# Patient Record
Sex: FEMALE | Race: BLACK OR AFRICAN AMERICAN | NOT HISPANIC OR LATINO | Employment: OTHER | URBAN - METROPOLITAN AREA
[De-identification: names, ages, dates, MRNs, and addresses within clinical notes are randomized per-mention and may not be internally consistent; named-entity substitution may affect disease eponyms.]

---

## 2018-04-16 ENCOUNTER — OFFICE VISIT (OUTPATIENT)
Dept: OBSTETRICS AND GYNECOLOGY | Facility: CLINIC | Age: 28
End: 2018-04-16
Payer: MEDICAID

## 2018-04-16 VITALS
HEIGHT: 65 IN | BODY MASS INDEX: 22.77 KG/M2 | SYSTOLIC BLOOD PRESSURE: 148 MMHG | WEIGHT: 136.69 LBS | DIASTOLIC BLOOD PRESSURE: 100 MMHG

## 2018-04-16 DIAGNOSIS — N76.4 VULVAR ABSCESS: Primary | ICD-10-CM

## 2018-04-16 PROCEDURE — 99999 PR PBB SHADOW E&M-EST. PATIENT-LVL II: CPT | Mod: PBBFAC,,, | Performed by: OBSTETRICS & GYNECOLOGY

## 2018-04-16 PROCEDURE — 99212 OFFICE O/P EST SF 10 MIN: CPT | Mod: PBBFAC,PN | Performed by: OBSTETRICS & GYNECOLOGY

## 2018-04-16 PROCEDURE — 99214 OFFICE O/P EST MOD 30 MIN: CPT | Mod: S$PBB,,, | Performed by: OBSTETRICS & GYNECOLOGY

## 2018-04-16 RX ORDER — CETIRIZINE HYDROCHLORIDE 10 MG/1
10 TABLET, CHEWABLE ORAL
COMMUNITY
End: 2018-04-16

## 2018-04-16 RX ORDER — SULFAMETHOXAZOLE AND TRIMETHOPRIM 400; 80 MG/1; MG/1
1 TABLET ORAL 2 TIMES DAILY
Qty: 20 TABLET | Refills: 0 | Status: SHIPPED | OUTPATIENT
Start: 2018-04-16 | End: 2018-04-26

## 2018-04-16 NOTE — PROGRESS NOTES
"Subjective:       Patient ID: Yumiko Bautista is a 27 y.o. female.    Chief Complaint:  Lymphadenopathy (during cycle under arm and vag area)      History of Present Illness  HPI  here for problem   C/o tenderness in left arm pit as well as "boil" in groin    Feels boil in groin has been there since high school; waxes; no current drainage from boil  But painful    Will get records for review  GYN & OB History  Patient's last menstrual period was 03/31/2018 (exact date).   Date of Last Pap: No result found    OB History   No data available       Review of Systems  Review of Systems   Constitutional: Negative for activity change, appetite change, chills, diaphoresis, fatigue, fever and unexpected weight change.   HENT: Negative for mouth sores and tinnitus.    Eyes: Negative for discharge and visual disturbance.   Respiratory: Negative for cough, shortness of breath and wheezing.    Cardiovascular: Negative for chest pain, palpitations and leg swelling.   Gastrointestinal: Negative for abdominal pain, bloating, blood in stool, constipation, diarrhea, nausea and vomiting.   Endocrine: Negative for diabetes, hair loss, hot flashes, hyperthyroidism and hypothyroidism.   Genitourinary: Positive for vaginal pain. Negative for decreased libido, dyspareunia, dysuria, flank pain, frequency, genital sores, hematuria, menorrhagia, menstrual problem, pelvic pain, urgency, vaginal bleeding, vaginal discharge, dysmenorrhea, urinary incontinence, postcoital bleeding, postmenopausal bleeding and vaginal odor.   Musculoskeletal: Negative for back pain and myalgias.   Skin:  Negative for rash, no acne and hair changes.   Neurological: Negative for seizures, syncope, numbness and headaches.   Hematological: Negative for adenopathy. Does not bruise/bleed easily.   Psychiatric/Behavioral: Negative for depression and sleep disturbance. The patient is not nervous/anxious.    Breast: Negative for breast mass, breast pain, nipple discharge " and skin changes          Objective:    Physical Exam:        Pulmonary/Chest: Right breast exhibits no tenderness. Left breast exhibits no tenderness.              Genitourinary:       Right adnexum displays no mass. Left adnexum displays no mass.                        Assessment:        1. Vulvar abscess               Plan:      Patient offered I&d of vulvar abscess  Declined  Advised abx ointment, warm compresses; rx sent for septra  Advised gentle skin cleansing

## 2018-12-17 ENCOUNTER — HOSPITAL ENCOUNTER (OUTPATIENT)
Dept: RADIOLOGY | Facility: HOSPITAL | Age: 28
Discharge: HOME | End: 2018-12-17
Attending: FAMILY MEDICINE
Payer: MEDICAID

## 2018-12-17 DIAGNOSIS — R10.2 PELVIC PAIN IN FEMALE: ICD-10-CM

## 2018-12-17 PROCEDURE — 76856 US EXAM PELVIC COMPLETE: CPT | Mod: TC

## 2019-02-22 ENCOUNTER — OFFICE VISIT (OUTPATIENT)
Dept: OBSTETRICS AND GYNECOLOGY | Facility: CLINIC | Age: 29
End: 2019-02-22
Payer: MEDICAID

## 2019-02-22 VITALS
BODY MASS INDEX: 25.01 KG/M2 | HEIGHT: 65 IN | DIASTOLIC BLOOD PRESSURE: 78 MMHG | SYSTOLIC BLOOD PRESSURE: 120 MMHG | WEIGHT: 150.13 LBS

## 2019-02-22 DIAGNOSIS — F52.5 FUNCTIONAL VAGINISMUS: ICD-10-CM

## 2019-02-22 DIAGNOSIS — Z01.419 WELL WOMAN EXAM WITH ROUTINE GYNECOLOGICAL EXAM: Primary | ICD-10-CM

## 2019-02-22 PROCEDURE — 99395 PREV VISIT EST AGE 18-39: CPT | Mod: S$PBB,,, | Performed by: OBSTETRICS & GYNECOLOGY

## 2019-02-22 PROCEDURE — 99395 PR PREVENTIVE VISIT,EST,18-39: ICD-10-PCS | Mod: S$PBB,,, | Performed by: OBSTETRICS & GYNECOLOGY

## 2019-02-22 PROCEDURE — 99999 PR PBB SHADOW E&M-EST. PATIENT-LVL III: ICD-10-PCS | Mod: PBBFAC,,, | Performed by: OBSTETRICS & GYNECOLOGY

## 2019-02-22 PROCEDURE — 99213 OFFICE O/P EST LOW 20 MIN: CPT | Mod: PBBFAC | Performed by: OBSTETRICS & GYNECOLOGY

## 2019-02-22 PROCEDURE — 99999 PR PBB SHADOW E&M-EST. PATIENT-LVL III: CPT | Mod: PBBFAC,,, | Performed by: OBSTETRICS & GYNECOLOGY

## 2019-02-22 RX ORDER — NIFEDIPINE 30 MG/1
TABLET, EXTENDED RELEASE ORAL
Refills: 0 | COMMUNITY
Start: 2019-02-05 | End: 2019-07-18

## 2019-02-22 RX ORDER — LORATADINE 10 MG/1
TABLET ORAL
Refills: 0 | COMMUNITY
Start: 2019-01-30 | End: 2019-07-18

## 2019-02-22 RX ORDER — DIVALPROEX SODIUM 500 MG/1
TABLET, DELAYED RELEASE ORAL
Refills: 3 | COMMUNITY
Start: 2019-01-18 | End: 2019-07-18

## 2019-02-28 NOTE — PROGRESS NOTES
HPI:  28 y.o. female patient  presents today for annual exam.  She reports longstanding issues with vaginal pain and tightness with insertion of anything.  She has been unable to use tampons, have intercourse, or tolerate pelvic exams.  She reports regular menses every month with no problems.  Also reports noticing a small cyst on the left vulva that occurs just with her periods.  She reports having issues with her bladder and urethra as a child and had to undergo multiple procedures and catheterizations due to that.  That experience was traumatic to her and she feels unable to relax with any type of exam related to her pelvic area.      Past Medical History:   Diagnosis Date    Hypertension     Mental disorder     Bi polar       Past Surgical History:   Procedure Laterality Date    cyst removal         REVIEW OF SYSTEMS:  GENERAL:  No fever, chills, fatigue, or weight loss  ABDOMEN:  Normal appetite, no weight loss or abdominal pain  URINARY:  No flank pain, dysuria, or hematuria  REPRODUCTIVE:  No abnormal vaginal bleeding  BREASTS:  No tenderness, masses, or nipple discharge noted of breasts    PHYSICAL EXAM:    APPEARANCE:  Well nourished, well developed, in no acute distress  NECK:  Symmetric without masses or thyromegaly  BREASTS:  Symmetrical, no skin changes or visible lesions.  No palpable masses, nipple discharge, or adenopathy bilaterally.  ABDOMEN:  Soft, no tenderness or masses, no distension noted  PELVIC:  VULVA:  No lesions.  Normal female genitalia  URETHRAL MEATUS:  Normal size and location.  No lesions.  No prolapse  URETHRA:  No masses, tenderness, prolapse, or scarring  VAGINA:  No lesions or discharge.  No significant cystocele or rectocele.  Unable to place a speculum due to extreme muscle tightness and patient intolerance of exam.  CERVIX:  Unable to visualize  UTERUS:  Unable to do exam due to patient intolerance  ADNEXA:  Unable to do exam  ANUS AND PERINEUM:  No lesions.  No  external hemorrhoids    Wet prep - negative    1. Well woman exam with routine gynecological exam     2. Functional vaginismus  Ambulatory consult to Physical Therapy         PLAN:  Discussed with patient that her history and exam are c/w a diagnosis of vaginismus.  Discussed vaginal dilator therapy with her.  I would recommend pelvic floor physical therapy for this.   Patient counseled regarding recommended routine health maintenance for her age.

## 2019-03-08 ENCOUNTER — CLINICAL SUPPORT (OUTPATIENT)
Dept: REHABILITATION | Facility: HOSPITAL | Age: 29
End: 2019-03-08
Attending: OBSTETRICS & GYNECOLOGY
Payer: MEDICAID

## 2019-03-08 DIAGNOSIS — R10.2 PELVIC PAIN IN FEMALE: Primary | ICD-10-CM

## 2019-03-08 PROCEDURE — 97161 PT EVAL LOW COMPLEX 20 MIN: CPT | Performed by: PHYSICAL THERAPIST

## 2019-03-08 NOTE — PROGRESS NOTES
OUTPATIENT PHYSICAL THERAPY EVALUATION        Patient: Yumiko GUSTAFSON [facility] #:  [id number]    Date of treatment: 03/08/2019         HISTORY      Yumiko GUSTAFSON is a 28 y.o. female evaluated on 03/08/2019    Physician:  Darlene Oliveros*   Diagnosis:   Encounter Diagnosis   Name Primary?    Pelvic pain in female Yes      Treatment ordered: Physical Therapy  Medical History:   Past Medical History:   Diagnosis Date    Hypertension     Mental disorder     Bi polar      Surgical History:   Past Surgical History:   Procedure Laterality Date    cyst removal        Medications:   Current Outpatient Medications   Medication Sig    divalproex (DEPAKOTE) 500 MG TbEC TK 1 T PO BID    loratadine (CLARITIN) 10 mg tablet TK 1 T PO AT BEDTIME PRN    NIFEdipine (PROCARDIA-XL) 30 MG (OSM) 24 hr tablet TK 1 T PO QAM     No current facility-administered medications for this visit.        Allergies: Review of patient's allergies indicates:  No Known Allergies     Precautions:     OB/GYN History:  pain with vaginal exam    Bladder/Bowel History: childhood bladder problems and constipation/straining for movement      SUBJECTIVE     Date of onset: Pt reports that she had traumatic experiences as a child being catherized for bladder infections and retention of urine that led her to loathe putting tampons inside her and becoming intimate with partners. Pt also reports vaginal exams with a lot of anxiety and she tenses her leg and pelvic mm. She denies pain with penetration of instruments or penis    Patient's goals for therapy: wear tampons, feel comfortable having sex, able to go to womans health exams without fear  Pain: Patient reports 0/10  Activities that cause symptoms: sexual activity and tampon insertion    Previous treatment included no    Sexually active? No at moment but has had relationships problems due to not wanting to be intimate  Pt denies bladder problems        Frequency of bowel movements: once every 2-10  days  Difficulty initiating BM: Yes  Quality/Shape of BM: type 1-3  Colon leakage: No  Frequency of incidents: 0   Amount leaked (bowels): 0  Form of protection: none  Number of pads required in 24 hours: 0      Types of fluid intake: Pt reports mostly drinking lemonade during the day and not much water  Diet: does not get fibrous foods in daily  Current exercise:none    Occupation: Pt works as a carecare taker     OBJECTIVE     ORTHO SCREEN  Posture: increased activation of gluteal mm  Pelvic alignment: no sign of deviations noted in supine    ABDOMINALS  Scarring: none  Diastasis: 0 fingers above umbilicus, 0 fingers at umbilicus, 0 fingers below umbilicus   Abdominal strength: Rectus: 3/5     TA: 3/5  Chaperone: refused  Consent signed: Yes    VAGINAL PELVIC FLOOR EXAM    EXTERNAL ASSESSMENT  Introitus: WNL  Skin condition: WNL- cyst on L labia majora with pain  Scarring: none   Sensation: WNL   Pain:  none  Voluntary contraction: visible lift  Voluntary relaxation: visible drop  Involuntary contraction: visible lift  Bearing down: reflex tightening  Perineal descent: absent      INTERNAL ASSESSMENT- not assessed today  Pain:   Sensation:   Vaginal vault:    Muscle Bulk:    Muscle Power: /5  Muscle Endurance:  sec  # Reps To Fatigue:     Fast Contractions:      Quality of contraction:    Specificity:    Coordination:    Prolapse check:  Comments:       TREATMENT      .    Education: instructed on general anatomy/physiology of urinary/bowel system; discussed plan of care with patient and parent/guardian; instructed in benefits/risks of treatment; instructed in alternative methods of treatment; instructed in risks of refusing treatment; patient a parent agreed to treatment plan.     Also educated in: anatomy/physiology of pelvic floor, vulvar irritants, dilator use and diaphragmatic breathing    ASSESSMENT      This is a 28 y.o. female referred to outpatient physical therapy and presents with a medical diagnosis of  pelvic pain and constipation. Patient will benefit from skilled physical therapy to improve function and pain.    SEMG Problems: did not evaluate  No skin irritation, erythema, or other adverse effects observed from electrode placement.    Educational/Spiritual/Cultural needs: none  Abuse/Neglect: no signs  Nutritional Status: WDWN   Fall Risk: 0    Pt's spiritual, cultural and educational needs considered and pt agreeable to plan of care and goals as stated below:     Medical necessity is demonstrated by the following IMPAIRMENTS/PROMBLEMS     History  Co-morbidities and personal factors that may impact the plan of care Examination  Body Structures and Functions, activity limitations and participation restrictions that may impact the plan of care Clinical Presentation   Decision Making/ Complexity Score   Co-morbidities:                 Personal Factors:    Body Regions/Systems/Functions:    poor trunk stability           Activity limitations:   Barriers to Learning: none  Environmental Barriers: none noted    Participation Restrictions:           low           PLAN    Frequency: 1-2 x  Weekly for 6 weeks  Duration: 2-3 weeks  Short Term Goals: 2-3 weeks   1. Pt report increasing her fiber intake to reduce constipation  2. Pt report increasing fluids to reduce constipation  3. Pt report toilet mechanics improvement   4. Pt report BM several times weekly  5. Pt report able to perform use of small dilator without pain for improved access to OBGYN    Long Term Goals: 6 weeks   Pt will report successfully having intercourse with < or = 2/10 pain for an improvement in activity tolerance.   Pt/family will be independent with HEP for continued self-management of symptoms.  Pt will be able to tolerate speculum exam for increased access to women's healthcare.     Physical therapy will include: therapeutic exercises, neuromuscular re-education, manual therapy, modalities PRN, patient/family education, dry needling and self  care/home management      Therapist: Reema Wade, PT  3/8/2019

## 2019-03-29 ENCOUNTER — CLINICAL SUPPORT (OUTPATIENT)
Dept: REHABILITATION | Facility: HOSPITAL | Age: 29
End: 2019-03-29
Attending: OBSTETRICS & GYNECOLOGY
Payer: MEDICAID

## 2019-03-29 DIAGNOSIS — R10.2 PELVIC PAIN IN FEMALE: Primary | ICD-10-CM

## 2019-03-29 PROCEDURE — 97110 THERAPEUTIC EXERCISES: CPT | Performed by: PHYSICAL THERAPIST

## 2019-03-29 PROCEDURE — 97535 SELF CARE MNGMENT TRAINING: CPT | Performed by: PHYSICAL THERAPIST

## 2019-03-29 PROCEDURE — 97140 MANUAL THERAPY 1/> REGIONS: CPT | Performed by: PHYSICAL THERAPIST

## 2019-03-29 NOTE — PROGRESS NOTES
OUTPATIENT PHYSICAL THERAPY DAILY NOTE       Patient: Yumiko Bautista   [facility] #:  [id number]    Diagnosis:   Encounter Diagnosis   Name Primary?    Pelvic pain in female Yes      Date of treatment: 03/29/2019   *    SUBJECTIVE   Pt reports: I have bought my dilators but have not used them.      OBJECTIVE     Therapeutic Exercise to develop relaxation of the pelvic floor muscles through body scan on CALM kendal 12 minutes including:   diaphragmatic breathing      Manual Therapy to develop desensitization for 12 minutes including: palpation externally to superficial pelvic mm and at entrance of the introitus 25 min        Education: Discussed progression of plan of care with patient; educated pt in activity modification; reviewed HEP with pt. Pt demonstrated and verbalized understanding of all instruction and was provided with a handout of HEP (see Patient Instructions). On pelvic anatomy 8 min and gave HEP of dilator at introitus but not inserted      ASSESSMENT      Pt tolerated entire treatment well. Pt had less anxiety today especially after educating her on the pelvic anatomy and where her urethra versus where her vagina was located    PLAN     Continue with established Plan of Care, working toward established PT goals.

## 2019-04-05 ENCOUNTER — CLINICAL SUPPORT (OUTPATIENT)
Dept: REHABILITATION | Facility: HOSPITAL | Age: 29
End: 2019-04-05
Attending: OBSTETRICS & GYNECOLOGY
Payer: MEDICAID

## 2019-04-05 DIAGNOSIS — R10.2 PELVIC PAIN IN FEMALE: Primary | ICD-10-CM

## 2019-04-05 PROCEDURE — 97140 MANUAL THERAPY 1/> REGIONS: CPT | Performed by: PHYSICAL THERAPIST

## 2019-04-05 NOTE — PROGRESS NOTES
OUTPATIENT PHYSICAL THERAPY DAILY NOTE       Patient: Yumiko Bautista   [facility] #:  [id number]    Diagnosis:   Encounter Diagnosis   Name Primary?    Pelvic pain in female Yes      Date of treatment: 04/05/2019   *    SUBJECTIVE   Pt reports: I used my dilators just at the entrance of vagina without pain or anxiety. I even went to a doctor visit where the doctor touched my labia and I had no fear      OBJECTIVE     Therapeutic Exercise to develop relaxation of the pelvic floor muscles through body scan on CALM kendal 12 minutes including:   diaphragmatic breathing      Manual Therapy to develop desensitization for 12 minutes including: palpation externally to superficial pelvic mm and at entrance of the introitus 25 min        Education: Discussed progression of plan of care with patient; educated pt in activity modification; reviewed HEP with pt. Pt demonstrated and verbalized understanding of all instruction and was provided with a handout of HEP (see Patient Instructions). On pelvic anatomy 8 min and gave HEP of dilator at introitus but not inserted      ASSESSMENT      Pt tolerated entire treatment well. Pt had less anxiety today especially after educating her on the pelvic anatomy and where her urethra versus where her vagina was located    PLAN     Continue with established Plan of Care, working toward established PT goals.

## 2019-04-12 ENCOUNTER — CLINICAL SUPPORT (OUTPATIENT)
Dept: REHABILITATION | Facility: HOSPITAL | Age: 29
End: 2019-04-12
Attending: OBSTETRICS & GYNECOLOGY
Payer: MEDICAID

## 2019-04-12 DIAGNOSIS — R10.2 PELVIC PAIN IN FEMALE: Primary | ICD-10-CM

## 2019-04-12 PROCEDURE — 97110 THERAPEUTIC EXERCISES: CPT | Performed by: PHYSICAL THERAPIST

## 2019-04-12 NOTE — PROGRESS NOTES
"  OUTPATIENT PHYSICAL THERAPY DAILY NOTE       Patient: Yumiko Bautista   [facility] #:  [id number]    Diagnosis:   Encounter Diagnosis   Name Primary?    Pelvic pain in female Yes      Date of treatment: 04/12/2019   *    SUBJECTIVE   Pt reports: I have been on my cycle so I have not been able to use my dilators.  I did attempt intercourse last week but it did not happen but I am happy that I had the desire and did not "run from it"      OBJECTIVE     Therapeutic Exercise to develop relaxation of the pelvic floor muscles through body scan on CALM kendal 12 minutes including:   diaphragmatic breathing      Education: Discussed progression of plan of care with patient; educated pt in activity modification; reviewed HEP with pt. Pt demonstrated and verbalized understanding of all instruction and was provided with a handout of HEP (see Patient Instructions). On pelvic anatomy 8 min and HEP using a mirror to look at anatomy      ASSESSMENT      Pt tolerated entire treatment well. Limited on therex and manual today due to patients desire not to perform any internal or external assessments due to menstrual cycle    PLAN     Continue with established Plan of Care, working toward established PT goals.   "

## 2019-04-26 ENCOUNTER — CLINICAL SUPPORT (OUTPATIENT)
Dept: REHABILITATION | Facility: HOSPITAL | Age: 29
End: 2019-04-26
Attending: OBSTETRICS & GYNECOLOGY
Payer: MEDICAID

## 2019-04-26 DIAGNOSIS — R10.2 PELVIC PAIN IN FEMALE: Primary | ICD-10-CM

## 2019-04-26 PROCEDURE — 97110 THERAPEUTIC EXERCISES: CPT | Performed by: PHYSICAL THERAPIST

## 2019-04-26 PROCEDURE — 97140 MANUAL THERAPY 1/> REGIONS: CPT | Performed by: PHYSICAL THERAPIST

## 2019-04-26 NOTE — PROGRESS NOTES
OUTPATIENT PHYSICAL THERAPY DAILY NOTE       Patient: Yumiko Buatista   [facility] #:  [id number]    Diagnosis:   Encounter Diagnosis   Name Primary?    Pelvic pain in female Yes      Date of treatment: 04/26/2019       SUBJECTIVE   Pt reports: I have been using my dilator 2x since last visit. I have not inserted it but put it close to my vagina    OBJECTIVE     Therapeutic Exercise to develop relaxation of the pelvic floor muscles through body scan on CALM kendal 12 minutes including:   diaphragmatic breathing    Manual: external manual to PF mm and desensitizing at entrance of introitus 25 min  Education: Discussed progression of plan of care with patient; educated pt in activity modification; reviewed HEP with pt. Pt demonstrated and verbalized understanding of all instruction and was provided with a handout of HEP (see Patient Instructions). Dilator use      ASSESSMENT      Pt has moderate pain with insertion at entrance of introitus on the L. Pt has mass( cyst) on L vaginal wall. Pt has had cyst removed here in the past and may benefit from removal to reduce pain upon insertion    PLAN     Continue with established Plan of Care, working toward established PT goals.

## 2019-05-17 ENCOUNTER — CLINICAL SUPPORT (OUTPATIENT)
Dept: REHABILITATION | Facility: HOSPITAL | Age: 29
End: 2019-05-17
Attending: OBSTETRICS & GYNECOLOGY
Payer: MEDICAID

## 2019-05-17 DIAGNOSIS — R10.2 PELVIC PAIN IN FEMALE: Primary | ICD-10-CM

## 2019-05-17 PROCEDURE — 97112 NEUROMUSCULAR REEDUCATION: CPT | Performed by: PHYSICAL THERAPIST

## 2019-05-17 PROCEDURE — 97535 SELF CARE MNGMENT TRAINING: CPT | Performed by: PHYSICAL THERAPIST

## 2019-05-17 PROCEDURE — 97140 MANUAL THERAPY 1/> REGIONS: CPT | Performed by: PHYSICAL THERAPIST

## 2019-05-17 NOTE — PROGRESS NOTES
OUTPATIENT PHYSICAL THERAPY DAILY NOTE       Patient: Yumiko Bautista   [facility] #:  [id number]    Diagnosis:   Encounter Diagnosis   Name Primary?    Pelvic pain in female Yes      Date of treatment: 05/17/2019       SUBJECTIVE   Pt reports: I have been using my dilator 2x since last visit. I have inserted in alittle in my vagina for several minutes    OBJECTIVE     Therapeutic Exercise to develop relaxation of the pelvic floor muscles through body scan on CALM kendal 12 minutes including:   diaphragmatic breathing    Manual: external manual to PF mm and desensitizing at entrance of introitus 25 min  Education: Discussed progression of plan of care with patient; educated pt in activity modification; reviewed HEP with pt. Pt demonstrated and verbalized understanding of all instruction and was provided with a handout of HEP (see Patient Instructions). Dilator use 8 min      ASSESSMENT      Pt has moderate pain with insertion at entrance of introitus on the L. Pt has mass( cyst) on L vaginal wall. Pt has had cyst removed here in the past and may benefit from removal to reduce pain upon insertion    PLAN     Continue with established Plan of Care, working toward established PT goals.

## 2019-05-31 ENCOUNTER — CLINICAL SUPPORT (OUTPATIENT)
Dept: REHABILITATION | Facility: HOSPITAL | Age: 29
End: 2019-05-31
Attending: OBSTETRICS & GYNECOLOGY
Payer: MEDICAID

## 2019-05-31 DIAGNOSIS — R10.2 PELVIC PAIN IN FEMALE: Primary | ICD-10-CM

## 2019-05-31 PROCEDURE — 97110 THERAPEUTIC EXERCISES: CPT | Performed by: PHYSICAL THERAPIST

## 2019-05-31 PROCEDURE — 97140 MANUAL THERAPY 1/> REGIONS: CPT | Performed by: PHYSICAL THERAPIST

## 2019-05-31 NOTE — PROGRESS NOTES
OUTPATIENT PHYSICAL THERAPY DAILY NOTE       Patient: Yumiko Bautista   [facility] #:  [id number]    Diagnosis:   Encounter Diagnosis   Name Primary?    Pelvic pain in female Yes      Date of treatment: 05/31/2019       SUBJECTIVE   Pt reports: I have been doing my HEP and pain is minimal. I can insert it 1/8 of the way for 6 min    OBJECTIVE     Therapeutic Exercise to develop relaxation of the pelvic floor muscles through body scan on CALM kendal 15 minutes including:   diaphragmatic breathing    Manual: external manual to PF mm and desensitizing at entrance of introitus 25 min  Education: Discussed progression of plan of care with patient; educated pt in activity modification; reviewed HEP with pt. Pt demonstrated and verbalized understanding of all instruction and was provided with a handout of HEP (see Patient Instructions). Dilator use 8 min      ASSESSMENT      Pt has moderate pain with insertion at entrance of introitus on the L 5-6/10 today . And 2/10 4 inches into vagina  PLAN     Continue with established Plan of Care, working toward established PT goals.

## 2019-06-21 ENCOUNTER — CLINICAL SUPPORT (OUTPATIENT)
Dept: REHABILITATION | Facility: HOSPITAL | Age: 29
End: 2019-06-21
Attending: OBSTETRICS & GYNECOLOGY
Payer: MEDICAID

## 2019-06-21 DIAGNOSIS — R10.2 PELVIC PAIN IN FEMALE: Primary | ICD-10-CM

## 2019-06-21 PROCEDURE — 97110 THERAPEUTIC EXERCISES: CPT | Performed by: PHYSICAL THERAPIST

## 2019-06-24 NOTE — PROGRESS NOTES
OUTPATIENT PHYSICAL THERAPY DAILY NOTE       Patient: Yumiko Bautista   [facility] #:  [id number]    Diagnosis:   Encounter Diagnosis   Name Primary?    Pelvic pain in female Yes      Date of treatment: 06/24/2019       SUBJECTIVE   Pt reports: I have been doing my HEP and pain is minimal. I can insert it 1/2 into my vagina for 5-10 minutes and even reinsert it without a lot of pain. I have been very constipated lately though    OBJECTIVE     Therapeutic Exercise to develop relaxation of the pelvic floor muscles through body scan on CALM kendal 15 minutes including:   diaphragmatic breathing    NMR: relaxation of pelvic floor mm and contraction 20 reps each of contract and relax 8 min   Education: Discussed progression of plan of care with patient; educated pt in activity modification; reviewed HEP with pt. Pt demonstrated and verbalized understanding of all instruction and was provided with a handout of HEP (see Patient Instructions). Dilator use 8 min and Bowel Program      ASSESSMENT      Pt has weak contraction of Pelvic mm with exteranl palpation and unable to relax PF mm. Pt given HEP to work on reverse kegels  PLAN     Continue with established Plan of Care, working toward established PT goals.

## 2019-07-15 ENCOUNTER — CLINICAL SUPPORT (OUTPATIENT)
Dept: REHABILITATION | Facility: HOSPITAL | Age: 29
End: 2019-07-15
Attending: OBSTETRICS & GYNECOLOGY
Payer: MEDICAID

## 2019-07-15 DIAGNOSIS — R10.2 PELVIC PAIN IN FEMALE: Primary | ICD-10-CM

## 2019-07-15 PROCEDURE — 97140 MANUAL THERAPY 1/> REGIONS: CPT | Performed by: PHYSICAL THERAPIST

## 2019-07-15 PROCEDURE — 97112 NEUROMUSCULAR REEDUCATION: CPT | Performed by: PHYSICAL THERAPIST

## 2019-07-16 ENCOUNTER — TELEPHONE (OUTPATIENT)
Dept: OBSTETRICS AND GYNECOLOGY | Facility: CLINIC | Age: 29
End: 2019-07-16

## 2019-07-16 NOTE — PROGRESS NOTES
OUTPATIENT PHYSICAL THERAPY DAILY NOTE       Patient: Yumiko Bautista   [facility] #:  [id number]    Diagnosis:   Encounter Diagnosis   Name Primary?    Pelvic pain in female Yes      Date of treatment: 07/16/2019       SUBJECTIVE   Pt reports: I have not been using my dilator lately- due to a UTI. I have been having low back pain and pain with urination nd was given a antibiotic that has been completed but I continue to have pain    OBJECTIVE         Manual: 25 min: external bulbocavernosus and ischiocavernosus on L, external transverse perineum and PF mm and obturator internus mm  NMR: relaxation of pelvic floor mm 8 min   Education: Discussed progression of plan of care with patient; educated pt in activity modification; reviewed HEP with pt. Pt demonstrated and verbalized understanding of all instruction and was provided with a handout of HEP (see Patient Instructions). Dilator use 5 min and Bowel Program and seslf massage to PF mm      ASSESSMENT      Pt has moderate increased tone and tenderness to superficial and deep PF mm and able to relax PF mm today with TC and VC  PLAN     Continue with established Plan of Care, working toward established PT goals.

## 2019-07-16 NOTE — TELEPHONE ENCOUNTER
----- Message from Ronel Bass sent at 7/16/2019  1:36 PM CDT -----  Contact: pt   Pt requesting a call back to schedule appointment.Patient has called previously and has not received a call back as of yet.  Please call back at [phone].        Thanks,  Ronel Bass

## 2019-07-16 NOTE — TELEPHONE ENCOUNTER
Spoke to patient and scheduled her appointment for 07/18/19 at 4:30pm to see Dr. Garcia at the [place] location. Patient verbalized understanding.

## 2019-07-16 NOTE — TELEPHONE ENCOUNTER
----- Message from Summer Whitlock sent at 7/16/2019  9:29 AM CDT -----  Contact: PT   Call pt to schedule an appt with Dr. Oliveros.   [phone] (home)

## 2019-07-18 ENCOUNTER — OFFICE VISIT (OUTPATIENT)
Dept: OBSTETRICS AND GYNECOLOGY | Facility: CLINIC | Age: 29
End: 2019-07-18
Payer: MEDICAID

## 2019-07-18 VITALS
BODY MASS INDEX: 24.83 KG/M2 | DIASTOLIC BLOOD PRESSURE: 82 MMHG | WEIGHT: 149.06 LBS | HEIGHT: 65 IN | SYSTOLIC BLOOD PRESSURE: 122 MMHG

## 2019-07-18 DIAGNOSIS — Z30.011 OCP (ORAL CONTRACEPTIVE PILLS) INITIATION: Primary | ICD-10-CM

## 2019-07-18 PROCEDURE — 99213 OFFICE O/P EST LOW 20 MIN: CPT | Mod: PBBFAC | Performed by: OBSTETRICS & GYNECOLOGY

## 2019-07-18 PROCEDURE — 99999 PR PBB SHADOW E&M-EST. PATIENT-LVL III: ICD-10-PCS | Mod: PBBFAC,,, | Performed by: OBSTETRICS & GYNECOLOGY

## 2019-07-18 PROCEDURE — 99999 PR PBB SHADOW E&M-EST. PATIENT-LVL III: CPT | Mod: PBBFAC,,, | Performed by: OBSTETRICS & GYNECOLOGY

## 2019-07-18 PROCEDURE — 99213 PR OFFICE/OUTPT VISIT, EST, LEVL III, 20-29 MIN: ICD-10-PCS | Mod: S$PBB,,, | Performed by: OBSTETRICS & GYNECOLOGY

## 2019-07-18 PROCEDURE — 99213 OFFICE O/P EST LOW 20 MIN: CPT | Mod: S$PBB,,, | Performed by: OBSTETRICS & GYNECOLOGY

## 2019-07-18 RX ORDER — MELOXICAM 7.5 MG/1
7.5 TABLET ORAL DAILY
Refills: 0 | COMMUNITY
Start: 2019-06-11 | End: 2020-06-29

## 2019-07-18 RX ORDER — FLUTICASONE PROPIONATE AND SALMETEROL 50; 250 UG/1; UG/1
POWDER RESPIRATORY (INHALATION)
Refills: 0 | COMMUNITY
Start: 2019-06-11 | End: 2020-06-29

## 2019-07-18 RX ORDER — ALBUTEROL SULFATE 90 UG/1
AEROSOL, METERED RESPIRATORY (INHALATION)
Refills: 0 | COMMUNITY
Start: 2019-05-16 | End: 2019-08-06 | Stop reason: SDUPTHER

## 2019-07-18 RX ORDER — NORETHINDRONE ACETATE AND ETHINYL ESTRADIOL 1MG-20(21)
1 KIT ORAL DAILY
Qty: 30 TABLET | Refills: 11 | Status: SHIPPED | OUTPATIENT
Start: 2019-07-18 | End: 2019-08-12 | Stop reason: ALTCHOICE

## 2019-07-24 NOTE — PROGRESS NOTES
"  Subjective:      Yumiko Bautista is a 28 y.o. female who presents for contraception counseling. The patient has no complaints today. The patient has never been sexually active. Pertinent past medical history: none.    Patient presents to office orginally for pelvic pain, but her period started and she realized that she was having PMS.  Patient is now interested in trying an ocp to control pms symptoms as when she is on her period, she cannot use her dilator to help with her vagismus.         Menstrual History:  OB History        0    Para   0    Term   0       0    AB   0    Living   0       SAB   0    TAB   0    Ectopic   0    Multiple   0    Live Births   0                Menarche age: 12  Patient's last menstrual period was 2019.           Review of Systems  Constitutional: negative  Eyes: negative  Ears, nose, mouth, throat, and face: negative  Respiratory: negative  Cardiovascular: negative  Gastrointestinal: negative  Genitourinary:negative  Integument/breast: negative  Hematologic/lymphatic: negative  Musculoskeletal:negative  Neurological: negative  Behavioral/Psych: negative  Endocrine: negative  Allergic/Immunologic: negative     Objective:      /82   Ht 5' 5" (1.651 m)   Wt 67.6 kg (149 lb 0.5 oz)   LMP 2019   BMI 24.80 kg/m²   General appearance: alert, appears stated age and cooperative  Head: Normocephalic, without obvious abnormality, atraumatic  Eyes: negative  Nose: no discharge  Neck: no adenopathy, no carotid bruit, no JVD, supple, symmetrical, trachea midline and thyroid not enlarged, symmetric, no tenderness/mass/nodules  Lungs: clear to auscultation bilaterally  Heart: S1, S2 normal and no S3 or S4  Abdomen: soft, non-tender; bowel sounds normal; no masses,  no organomegaly  Extremities: extremities normal, atraumatic, no cyanosis or edema  Neurologic: Grossly normal     Assessment:      28 y.o., starting OCP (estrogen/progesterone), no contraindications. "     Plan:      All questions answered.  Discussed healthy lifestyle modifications.  Educational material distributed.

## 2019-07-26 ENCOUNTER — CLINICAL SUPPORT (OUTPATIENT)
Dept: REHABILITATION | Facility: HOSPITAL | Age: 29
End: 2019-07-26
Attending: OBSTETRICS & GYNECOLOGY
Payer: MEDICAID

## 2019-07-26 DIAGNOSIS — R10.2 PELVIC PAIN IN FEMALE: Primary | ICD-10-CM

## 2019-07-26 PROCEDURE — 97140 MANUAL THERAPY 1/> REGIONS: CPT | Performed by: PHYSICAL THERAPIST

## 2019-07-26 PROCEDURE — 97535 SELF CARE MNGMENT TRAINING: CPT | Performed by: PHYSICAL THERAPIST

## 2019-07-26 PROCEDURE — 97112 NEUROMUSCULAR REEDUCATION: CPT | Performed by: PHYSICAL THERAPIST

## 2019-07-26 NOTE — PROGRESS NOTES
OUTPATIENT PHYSICAL THERAPY DAILY NOTE       Patient: Yumiko Bautista   [facility] #:  [id number]    Diagnosis:   Encounter Diagnosis   Name Primary?    Pelvic pain in female Yes      Date of treatment: 07/26/2019       SUBJECTIVE   Pt reports: I went to new OB who put me on BC pillls to regulate my cycel for better access to PT. She did not feel a cyst at the time, but reports that my drainage system in my body could be a reason I have swelling around my cycle    OBJECTIVE         Manual: 25 min: external bulbocavernosus and ischiocavernosus on L, external transverse perineum and PF mm and obturator internus mm,   NMR: relaxation of pelvic floor mm 8 min   Education: Discussed progression of plan of care with patient; educated pt in activity modification; reviewed HEP with pt. Pt demonstrated and verbalized understanding of all instruction and was provided with a handout of HEP (see Patient Instructions). Dilator use 5 min and Bowel Program and seslf massage to PF mm    ASSESSMENT      Pt has improved relaxation of PF mm with VC and TC today. Stool was felt in vaginal vault and pt received education  On a bowel Program to reduce constipation as this narrows her vaginal vault and causes pain with penetration and leads to increased mm activity and tone in the PF muscles which cause further pain.  PLAN     Continue with established Plan of Care, working toward established PT goals.

## 2019-08-06 ENCOUNTER — HOSPITAL ENCOUNTER (EMERGENCY)
Facility: HOSPITAL | Age: 29
Discharge: HOME | End: 2019-08-06
Attending: EMERGENCY MEDICINE
Payer: MEDICAID

## 2019-08-06 VITALS
DIASTOLIC BLOOD PRESSURE: 80 MMHG | WEIGHT: 154.31 LBS | TEMPERATURE: 99 F | SYSTOLIC BLOOD PRESSURE: 126 MMHG | RESPIRATION RATE: 18 BRPM | BODY MASS INDEX: 25.71 KG/M2 | OXYGEN SATURATION: 100 % | HEART RATE: 89 BPM | HEIGHT: 65 IN

## 2019-08-06 DIAGNOSIS — Z87.09 HISTORY OF ASTHMA: ICD-10-CM

## 2019-08-06 DIAGNOSIS — R06.02 SHORTNESS OF BREATH: Primary | ICD-10-CM

## 2019-08-06 LAB
ANION GAP SERPL CALC-SCNC: 9 MMOL/L (ref 8–16)
BUN SERPL-MCNC: 11 MG/DL (ref 6–20)
CALCIUM SERPL-MCNC: 9.6 MG/DL (ref 8.7–10.5)
CHLORIDE SERPL-SCNC: 109 MMOL/L (ref 95–110)
CO2 SERPL-SCNC: 24 MMOL/L (ref 23–29)
CREAT SERPL-MCNC: 0.8 MG/DL (ref 0.5–1.4)
D DIMER PPP IA.FEU-MCNC: 0.55 MG/L FEU
EST. GFR  (AFRICAN AMERICAN): >60 ML/MIN/1.73 M^2
EST. GFR  (NON AFRICAN AMERICAN): >60 ML/MIN/1.73 M^2
GLUCOSE SERPL-MCNC: 98 MG/DL (ref 70–110)
POTASSIUM SERPL-SCNC: 4.2 MMOL/L (ref 3.5–5.1)
SODIUM SERPL-SCNC: 142 MMOL/L (ref 136–145)

## 2019-08-06 PROCEDURE — 99285 EMERGENCY DEPT VISIT HI MDM: CPT | Mod: 25

## 2019-08-06 PROCEDURE — 93005 ELECTROCARDIOGRAM TRACING: CPT

## 2019-08-06 PROCEDURE — 80048 BASIC METABOLIC PNL TOTAL CA: CPT

## 2019-08-06 PROCEDURE — 93010 EKG 12-LEAD: ICD-10-PCS | Mod: ,,, | Performed by: INTERNAL MEDICINE

## 2019-08-06 PROCEDURE — 93010 ELECTROCARDIOGRAM REPORT: CPT | Mod: ,,, | Performed by: INTERNAL MEDICINE

## 2019-08-06 PROCEDURE — 85379 FIBRIN DEGRADATION QUANT: CPT

## 2019-08-06 PROCEDURE — 25500020 PHARM REV CODE 255: Performed by: EMERGENCY MEDICINE

## 2019-08-06 RX ORDER — LORATADINE 10 MG/1
10 TABLET ORAL DAILY
COMMUNITY
End: 2020-06-29

## 2019-08-06 RX ORDER — ALBUTEROL SULFATE 90 UG/1
2 AEROSOL, METERED RESPIRATORY (INHALATION) EVERY 6 HOURS PRN
Qty: 18 G | Refills: 0 | Status: SHIPPED | OUTPATIENT
Start: 2019-08-06 | End: 2021-03-05

## 2019-08-06 RX ADMIN — IOHEXOL 100 ML: 350 INJECTION, SOLUTION INTRAVENOUS at 11:08

## 2019-08-06 NOTE — ED NOTES
Confirmed with CT that pt reports recently leaving her doctor's office and getting her new birth control. Pt not pregnant at this time.

## 2019-08-06 NOTE — ED PROVIDER NOTES
SCRIBE #1 NOTE: I, Corinne Mack, am scribing for, and in the presence of, Evelin Grigsby MD. I have scribed the entire note.      History      Chief Complaint   Patient presents with    Shortness of Breath     Pt reports onset of SOB last night with increased difficulty in breathing this morning. Pt reports not being able to find her inhaler.       Review of patient's allergies indicates:  No Known Allergies     HPI   HPI    8/6/2019, 9:11 AM   History obtained from the patient      History of Present Illness: Yumiko Bautista is a 28 y.o. female patient with PMHx of HTN, Bipolar disorder, and asthma who presents to the Emergency Department for SOB which onset gradually last night. Pt was dx with asthma 2 months ago and could not find her inhaler for her SOB so she presented to the ED. Symptoms are constant and moderate in severity. No mitigating or exacerbating factors reported. Associated sxs include cough. Patient denies any fever, chills, CP, N/V/D, abd pain, back pain, neck pain, HA, dizziness, and all other sxs at this time. Prior Tx includes Advair with some relief. Pt reports that she is on birth control. No further complaints or concerns at this time.         Arrival mode: Personal vehicle    PCP: Primary Doctor No       Past Medical History:  Past Medical History:   Diagnosis Date    Hypertension     Mental disorder     Bi polar       Past Surgical History:  Past Surgical History:   Procedure Laterality Date    cyst removal           Family History:  Family History   Problem Relation Age of Onset    Hypertension Mother     Hypertension Paternal Grandfather     Ovarian cancer Paternal Grandmother     Hypertension Paternal Grandmother     Hypertension Maternal Grandmother     Hypertension Maternal Grandfather        Social History:  Social History     Tobacco Use    Smoking status: Never Smoker    Smokeless tobacco: Never Used   Substance and Sexual Activity    Alcohol use: No    Drug use:  "No    Sexual activity: Not Currently       ROS   Review of Systems   Constitutional: Negative for chills and fever.   Respiratory: Positive for cough and shortness of breath.    Cardiovascular: Negative for chest pain and leg swelling.   Gastrointestinal: Negative for abdominal pain, diarrhea, nausea and vomiting.   Musculoskeletal: Negative for back pain, neck pain and neck stiffness.   Skin: Negative for rash and wound.   Neurological: Negative for dizziness, light-headedness, numbness and headaches.   All other systems reviewed and are negative.    Physical Exam      Initial Vitals [08/06/19 0904]   BP Pulse Resp Temp SpO2   (!) 139/96 87 -- 98.7 °F (37.1 °C) 100 %      MAP       --          Physical Exam  Nursing Notes and Vital Signs Reviewed.  Constitutional: Patient is in no acute distress. Well-developed and well-nourished.  Head: Atraumatic. Normocephalic.  Eyes: PERRL. EOM intact. Conjunctivae are not pale. No scleral icterus.  ENT: Mucous membranes are moist. Oropharynx is clear and symmetric.    Neck: Supple. Full ROM. No lymphadenopathy.  Cardiovascular: Regular rate. Regular rhythm. No murmurs, rubs, or gallops. Distal pulses are 2+ and symmetric.  Pulmonary/Chest: No respiratory distress. Clear to auscultation bilaterally. No wheezing or rales.  Abdominal: Soft and non-distended.  There is no tenderness.  No rebound, guarding, or rigidity.   Musculoskeletal: Moves all extremities. No obvious deformities. No edema.   Skin: Warm and dry.  Neurological:  Alert, awake, and appropriate.  Normal speech.  No acute focal neurological deficits are appreciated.  Psychiatric: Normal affect. Good eye contact. Appropriate in content.    ED Course    Procedures  ED Vital Signs:  Vitals:    08/06/19 0904 08/06/19 1000   BP: (!) 139/96 128/85   Pulse: 87 86   Temp: 98.7 °F (37.1 °C) 98.3 °F (36.8 °C)   TempSrc: Oral Oral   SpO2: 100% 100%   Weight: 70 kg (154 lb 4.8 oz)    Height: 5' 5" (1.651 m)        Abnormal Lab " Results:  Labs Reviewed   D DIMER, QUANTITATIVE - Abnormal; Notable for the following components:       Result Value    D-Dimer 0.55 (*)     All other components within normal limits   BASIC METABOLIC PANEL        All Lab Results:  Results for orders placed or performed during the hospital encounter of 08/06/19   D dimer, quantitative   Result Value Ref Range    D-Dimer 0.55 (H) <0.50 mg/L FEU   Basic metabolic panel   Result Value Ref Range    Sodium 142 136 - 145 mmol/L    Potassium 4.2 3.5 - 5.1 mmol/L    Chloride 109 95 - 110 mmol/L    CO2 24 23 - 29 mmol/L    Glucose 98 70 - 110 mg/dL    BUN, Bld 11 6 - 20 mg/dL    Creatinine 0.8 0.5 - 1.4 mg/dL    Calcium 9.6 8.7 - 10.5 mg/dL    Anion Gap 9 8 - 16 mmol/L    eGFR if African American >60 >60 mL/min/1.73 m^2    eGFR if non African American >60 >60 mL/min/1.73 m^2       Imaging Results:  Imaging Results          CTA Chest Non-Coronary (PE Study) (Final result)  Result time 08/06/19 12:12:47    Final result by Nathan Lyons MD (08/06/19 12:12:47)                 Impression:      1. No evidence to suggest pulmonary embolism.    2.  No acute pathology noted within the chest.    All CT scans at this facility use dose modulation, iterative reconstruction and/or weight based dosing when appropriate to reduce radiation dose to as low as reasonably achievable.      Electronically signed by: Nathan Lyons MD  Date:    08/06/2019  Time:    12:12             Narrative:    EXAMINATION:  CTA CHEST NON CORONARY    CLINICAL HISTORY:  Chest pain, acute, PE suspected, intermed prob, positive D-dimer;    TECHNIQUE:  CT of the chest was acquired helically utilizing a low-dose technique from the lung apices through the posterior costophrenic angles status post administration of 100 cc of Omnipaque 350.  Bolus timing was utilized to optimize opacification of the pulmonary arterial system. 3-D maximum intensity projection and multiplanar reconstructions were created from the source data  set and interpreted.    COMPARISON:  None    FINDINGS:  No infiltrates or pleural effusions are identified.  No discrete pulmonary nodules or masses are identified.    The aorta demonstrates normal caliber and contour. There is good opacification of the pulmonary arterial system. No intraluminal filling defects are notified within the pulmonary arterial system to suggest pulmonary embolism. There is no pericardial effusion.  No enlarged mediastinal, hilar or axillary lymph nodes are identified.    The visualized upper abdomen is unremarkable in appearance.    The osseous structures are unremarkable in appearance.                               X-Ray Chest PA And Lateral (Final result)  Result time 08/06/19 09:35:10    Final result by Nathan Lyons MD (08/06/19 09:35:10)                 Impression:      No acute cardiopulmonary disease.      Electronically signed by: Nathan Lyons MD  Date:    08/06/2019  Time:    09:35             Narrative:    EXAMINATION:  XR CHEST PA AND LATERAL    CLINICAL HISTORY:  Shortness of breath    TECHNIQUE:  PA and lateral views of the chest were performed.    COMPARISON:  None    FINDINGS:  The lungs are clear, with normal appearance of pulmonary vasculature.  No pleural effusion or pneumothorax.    The cardiac silhouette is normal in size. The hilar and mediastinal contours are unremarkable.                                 The EKG was ordered, reviewed, and independently interpreted by the ED provider.  Interpretation time: 0933  Rate: 91 BPM  Rhythm: normal sinus rhythm  Interpretation: Nonspecific T wave abnormality. Prolonged QT. No STEMI.           The Emergency Provider reviewed the vital signs and test results, which are outlined above.    ED Discussion     10:45 AM: Re-evaluated pt. Pt is resting comfortably and is in no acute distress.  D/w pt all pertinent results. D/w pt any concerns expressed at this time. Answered all questions. Pt expresses understanding at this  time.    12:20 PM: Reassessed pt at this time. Pt is awake, alert, and in no distress. Discussed with pt all pertinent ED information and results. Discussed pt dx and plan of tx. Gave pt all f/u and return to the ED instructions. All questions and concerns were addressed at this time. Pt expresses understanding of information and instructions, and is comfortable with plan to discharge. Pt is stable for discharge.    I discussed with patient and/or family/caretaker that evaluation in the ED does not suggest any emergent or life threatening medical conditions requiring immediate intervention beyond what was provided in the ED, and I believe patient is safe for discharge.  Regardless, an unremarkable evaluation in the ED does not preclude the development or presence of a serious of life threatening condition. As such, patient was instructed to return immediately for any worsening or change in current symptoms.      ED Medication(s):  Medications   iohexol (OMNIPAQUE 350) injection 100 mL (100 mLs Intravenous Given 8/6/19 3091)          Medication List      CHANGE how you take these medications    albuterol 90 mcg/actuation inhaler  Commonly known as:  PROVENTIL/VENTOLIN HFA  Inhale 2 puffs into the lungs every 6 (six) hours as needed for Wheezing or Shortness of Breath. Rescue  What changed:  See the new instructions.        ASK your doctor about these medications    ADVAIR DISKUS 250-50 mcg/dose diskus inhaler  Generic drug:  fluticasone-salmeterol 250-50 mcg/dose     loratadine 10 mg tablet  Commonly known as:  CLARITIN     meloxicam 7.5 MG tablet  Commonly known as:  MOBIC     norethindrone-ethinyl estradiol 1 mg-20 mcg (21)/75 mg (7) per tablet  Commonly known as:  JUNEL FE 1/20  Take 1 tablet by mouth once daily.           Where to Get Your Medications      These medications were sent to [id number] - [place], [place] - [address] AT [facility] [address]  [address], [place] [address]     Phone:  [phone]   · albuterol 90 mcg/actuation inhaler         Follow-up Information     [facility]. Schedule an appointment as soon as possible for a visit in 2 days.    Why:  Return to the Emergency Room, If symptoms worsen  Contact information:  [address] [place] [address]  [phone]                     Medical Decision Making    Medical Decision Making:   Clinical Tests:   Lab Tests: Ordered and Reviewed  Radiological Study: Ordered and Reviewed  Medical Tests: Ordered and Reviewed           Scribe Attestation:   Scribe #1: I performed the above scribed service and the documentation accurately describes the services I performed. I attest to the accuracy of the note 08/06/2019.    Attending:   Physician Attestation Statement for Scribe #1: I, Evelin Grigsby MD, personally performed the services described in this documentation, as scribed by Corinne Mack, in my presence, and it is both accurate and complete.          Clinical Impression       ICD-10-CM ICD-9-CM   1. Shortness of breath R06.02 786.05   2. History of asthma Z87.09 V12.69       Disposition:   Disposition: Discharged  Condition: Stable           Evelin Grigsby MD  08/06/19 3781

## 2019-08-06 NOTE — ED NOTES
"Pt c/o SOB, "I think I was having an asthma attack but I don't know what that really feels like. I was recently diagnosed with asthma and have lost my inhaler."    Patient moved to ED room 7, patient assisted onto stretcher and changed into a gown. Patient placed on continuous pulse oximetry and automatic blood pressure cuff. Bed placed in low locked position, side rails up x 2, call light is within reach of patient or family, orientation to room and explanation of wait provided to family and patient, alarms set and turned on for monitor and pulse ox, awaiting MD evaluation and orders, will continue to monitor.    Patient identifies self as Yumiko Bautista      LOC: The patient is awake, alert and aware of environment with an appropriate affect, the patient is oriented x 3 and speaking appropriately.  APPEARANCE: Patient resting comfortably and in no acute distress, patient is clean and well groomed, patient's clothing is properly fastened.  SKIN: The skin is warm and dry, color consistent with ethnicity, patient has normal skin turgor and moist mucus membranes, skin intact, no breakdown or bruising noted.  MUSCULOSKELETAL: Patient moving all extremities well, no obvious swelling or deformities noted.  RESPIRATORY: Airway is open and patent, respirations are spontaneous, patient has a normal effort and rate, no accessory muscle use noted. O2 sat 96.  CARDIAC: Patient has a normal rate, no peripheral edema noted, capillary refill < 3 seconds.  ABDOMEN: Soft and non tender to palpation, no distention noted.  NEUROLOGIC: PERRL, eyes open spontaneously, behavior appropriate to situation, follows commands, facial expression symmetrical, bilateral hand grasp equal and even, purposeful motor response noted, normal sensation in all extremities when touched with a finger.        "

## 2019-08-06 NOTE — ED NOTES
Patient changed into gown and placed on automatic blood pressure cuff and continuous pulse oximeter.

## 2019-08-09 ENCOUNTER — TELEPHONE (OUTPATIENT)
Dept: OBSTETRICS AND GYNECOLOGY | Facility: CLINIC | Age: 29
End: 2019-08-09

## 2019-08-09 NOTE — TELEPHONE ENCOUNTER
Spoke with the patient and she said that she started OCP's in July (7/18/19) when she seen Dr Garcia and she is still bleeding but she had to go to the ER on 8/6/19 and she said that they told her that the blood test was positive for a blood clot but the CT couldn't confirm the location. Patient wants to know if there is another birth control she can be switched to due to the blood clot. I informed the patient that Dr Garcia is out of the office today so I would forward this to her and she should get a call back on Monday. Patient verbalized understanding, Gali

## 2019-08-09 NOTE — TELEPHONE ENCOUNTER
----- Message from Ana Tovar sent at 8/9/2019  9:23 AM CDT -----  Contact: pt  Pt request call back regarding new birth control , pt stated she recently went to  ER ... call back :....[phone]

## 2019-08-09 NOTE — TELEPHONE ENCOUNTER
----- Message from Eleanor Wagner sent at 8/9/2019  3:14 PM CDT -----  Contact: pt  States she left a message this morning regarding the medication that was prescribed for her. Please call pt at [phone]. Thank you

## 2019-08-12 RX ORDER — NORETHINDRONE ACETATE AND ETHINYL ESTRADIOL 1.5-30(21)
1 KIT ORAL DAILY
Qty: 30 TABLET | Refills: 11 | Status: SHIPPED | OUTPATIENT
Start: 2019-08-12 | End: 2020-06-29

## 2019-08-12 NOTE — TELEPHONE ENCOUNTER
Spoke with the patient and notified her of Dr Garcia's recommendations and the patient did follow up with her PCP. Patient instructed to stop current OCP's today and start the new one on Sunday. Patient verbalized understanding, Crystal

## 2019-08-12 NOTE — TELEPHONE ENCOUNTER
I reviewed the ER report, she  DID NOT have a pulmonary embolus.  She DID NOT have a blood clot in her lungs.  Please make sure she understands this.  I will change her to a ocp that has more estrogen that should help the bleeding, but she DID NOT have a blood clot.  Thank you.

## 2019-08-16 ENCOUNTER — CLINICAL SUPPORT (OUTPATIENT)
Dept: REHABILITATION | Facility: HOSPITAL | Age: 29
End: 2019-08-16
Attending: OBSTETRICS & GYNECOLOGY
Payer: MEDICAID

## 2019-08-16 DIAGNOSIS — R10.2 PELVIC PAIN IN FEMALE: Primary | ICD-10-CM

## 2019-08-16 PROCEDURE — 97535 SELF CARE MNGMENT TRAINING: CPT | Performed by: PHYSICAL THERAPIST

## 2019-08-16 PROCEDURE — 97112 NEUROMUSCULAR REEDUCATION: CPT | Performed by: PHYSICAL THERAPIST

## 2019-08-16 PROCEDURE — 97140 MANUAL THERAPY 1/> REGIONS: CPT | Performed by: PHYSICAL THERAPIST

## 2019-08-16 NOTE — PROGRESS NOTES
OUTPATIENT PHYSICAL THERAPY DAILY NOTE       Patient: Yumiko Bautista   [facility] #:  [id number]    Diagnosis:   Encounter Diagnosis   Name Primary?    Pelvic pain in female Yes      Date of treatment: 08/16/2019       SUBJECTIVE   Pt reports: I was in the ER recently after I started the birth control due to shortness of breath. ER though possible blood clot, but did not find anytihng and was dx with asthma. I stopped the brth control because it was not helping with the bleeding. I am taking miralax and having more frequent BM. I continue to have urinary frequency and I don't know why    OBJECTIVE         Manual: 25 min: external bulbocavernosus and ischiocavernosus on L, external transverse perineum and PF mm and obturator internus mm,   NMR: relaxation of pelvic floor mm 8 min   Education: Discussed progression of plan of care with patient; educated pt in activity modification; reviewed HEP with pt. Pt demonstrated and verbalized understanding of all instruction and was provided with a handout of HEP (see Patient Instructions). Dilator use 15 min and Bowel Program and seslf massage to PF mm    ASSESSMENT      Pt reported only drinking 1 liter of water daily. I feel this is a cause for her bladder nad bowel issues. We discussed this and pt on bowel and bladder program. Pt has mild tenderenss to PF mm today with external palpation. Educated in dilator usage   PLAN     Continue with established Plan of Care, working toward established PT goals.

## 2019-08-26 ENCOUNTER — CLINICAL SUPPORT (OUTPATIENT)
Dept: REHABILITATION | Facility: HOSPITAL | Age: 29
End: 2019-08-26
Attending: OBSTETRICS & GYNECOLOGY
Payer: MEDICAID

## 2019-08-26 DIAGNOSIS — R10.2 PELVIC PAIN IN FEMALE: Primary | ICD-10-CM

## 2019-08-26 PROCEDURE — 97110 THERAPEUTIC EXERCISES: CPT | Performed by: PHYSICAL THERAPIST

## 2019-08-26 PROCEDURE — 97140 MANUAL THERAPY 1/> REGIONS: CPT | Performed by: PHYSICAL THERAPIST

## 2019-08-26 PROCEDURE — 97535 SELF CARE MNGMENT TRAINING: CPT | Performed by: PHYSICAL THERAPIST

## 2019-08-26 NOTE — PLAN OF CARE
OUTPATIENT PHYSICAL THERAPY PROGRESS NOTE       Patient: Yumiko Bautista   [facility] #:  [id number]    Diagnosis:   Encounter Diagnosis   Name Primary?    Pelvic pain in female Yes      Date of treatment: 08/26/2019     Time in: 1100  Time out: 1200  Billable time: 60 min  Visit #: 3/12  Auth: 12/31/19  POC expiration: 8/24/19    SUBJECTIVE   Pt reports: I have not been using my dilators and I know how important it is to do this and I will be better  Pain: 10/10 on VAS scale  Pain location: during penetration and vaginal exams for health  OBJECTIVE     Therapeutic Exercise Diaphragmatic breathing for mm relaxation 15 min    Manual Therapy to develop improved mm desensitization for 25 minutes including: progressive mm relaxation during manual      Education: Discussed progression of plan of care with patient; educated pt in activity modification; reviewed HEP with pt. Pt demonstrated and verbalized understanding of all instruction on dilator use and importance of use of dilators to learn how to relax PF mm    ASSESSMENT      Pt progress slow but steady. Pt with heightened tone of PF mm. Able to insert 1 digit into vagina with pain 7/10 at introitus and with deep penetration. Pain and tone reduced with progressive mm relaxation to a 3/10      PLAN     Continue with established Plan of Care, working toward established PT goals.

## 2019-09-09 ENCOUNTER — CLINICAL SUPPORT (OUTPATIENT)
Dept: REHABILITATION | Facility: HOSPITAL | Age: 29
End: 2019-09-09
Attending: OBSTETRICS & GYNECOLOGY
Payer: MEDICAID

## 2019-09-09 DIAGNOSIS — R10.2 PELVIC PAIN IN FEMALE: Primary | ICD-10-CM

## 2019-09-09 PROCEDURE — 97112 NEUROMUSCULAR REEDUCATION: CPT | Performed by: PHYSICAL THERAPIST

## 2019-09-09 NOTE — PROGRESS NOTES
"See PN in POC  OUTPATIENT PHYSICAL THERAPY DAILY NOTE       Patient: Yumiko Bautista   [facility] #:  [id number]    Diagnosis:   No diagnosis found.   Date of treatment: 09/09/2019       SUBJECTIVE   Pt reports:I am still having irregular bleeding and I'm not sure If I want to get on BC again.  I am also constipated again because I have not been eating well or drinking enough water. I used my dilator 3x this week and It was "ok"    OBJECTIVE         Manual: 8 min: external bulbocavernosus and ischiocavernosus on L, external transverse perineum and PF mm and obturator internus mm,   NMR: relaxation and contraction of pelvic floor mm 28 min including kegels with SEMG and reverse kegels with SEMG  Education: Discussed progression of plan of care with patient; educated pt in activity modification; reviewed HEP with pt. Pt demonstrated and verbalized understanding of all instruction and was provided with a handout of HEP (see Patient Instructions). Dilator use 15 min and Bowel Program and seslf massage to PF mm    ASSESSMENT      Pt reports that she will continue to drink more water daily to improve her bladder and bowel issues. Pt needs lots of TC and VC for pelvic floor mm contraction and relaxation. Pt resting tone was 0-7MV today. After a few pelvic contractions and relaxations, Pt demo improved mm resting tone  PLAN     Continue with established Plan of Care, working toward established PT goals.   "

## 2019-09-16 ENCOUNTER — CLINICAL SUPPORT (OUTPATIENT)
Dept: REHABILITATION | Facility: HOSPITAL | Age: 29
End: 2019-09-16
Attending: OBSTETRICS & GYNECOLOGY
Payer: MEDICAID

## 2019-09-16 DIAGNOSIS — R10.2 PELVIC PAIN IN FEMALE: Primary | ICD-10-CM

## 2019-09-16 PROCEDURE — 97112 NEUROMUSCULAR REEDUCATION: CPT | Performed by: PHYSICAL THERAPIST

## 2019-09-16 PROCEDURE — 97535 SELF CARE MNGMENT TRAINING: CPT | Performed by: PHYSICAL THERAPIST

## 2019-09-16 PROCEDURE — 97140 MANUAL THERAPY 1/> REGIONS: CPT | Performed by: PHYSICAL THERAPIST

## 2019-09-16 NOTE — PROGRESS NOTES
See PN in POC  OUTPATIENT PHYSICAL THERAPY DAILY NOTE       Patient: Yumiko Bautista   [facility] #:  [id number]    Diagnosis:   Encounter Diagnosis   Name Primary?    Pelvic pain in female Yes      Date of treatment: 09/16/2019       SUBJECTIVE   Pt reports:I am using my dilator- inserting it in and out with minimal to no pain    OBJECTIVE         Manual: 8 min: external bulbocavernosus and ischiocavernosus on L, external transverse perineum and PF mm and obturator internus mm,   NMR: relaxation and contraction of pelvic floor mm 28 min including kegels with SEMG and reverse kegels with SEMG and Diaphramatic breathing for improved lateral and back rib expansion  Education: Discussed progression of plan of care with patient; educated pt in activity modification; reviewed HEP with pt. Pt demonstrated and verbalized understanding of all instruction and was provided with a handout of HEP (see Patient Instructions). Education on breathing, kegels and bowel and bladder program    ASSESSMENT      Pt demo improved pelvic floor mm relaxation and contraction today on SEMG. Pt demo shallow breathing pattern and educated on importance of proper breathing for pelvic floor mm activation and to decrease pressure in low back, pelvis and neck  PLAN     Continue with established Plan of Care, working toward established PT goals.

## 2019-09-23 ENCOUNTER — CLINICAL SUPPORT (OUTPATIENT)
Dept: REHABILITATION | Facility: HOSPITAL | Age: 29
End: 2019-09-23
Attending: OBSTETRICS & GYNECOLOGY
Payer: MEDICAID

## 2019-09-23 DIAGNOSIS — R10.2 PELVIC PAIN IN FEMALE: Primary | ICD-10-CM

## 2019-09-23 PROCEDURE — 97110 THERAPEUTIC EXERCISES: CPT | Performed by: PHYSICAL THERAPIST

## 2019-09-23 NOTE — PROGRESS NOTES
See PN in POC  OUTPATIENT PHYSICAL THERAPY DAILY NOTE       Patient: Yumiko Bautista   [facility] #:  [id number]    Diagnosis:   Encounter Diagnosis   Name Primary?    Pelvic pain in female Yes      Date of treatment: 09/23/2019       SUBJECTIVE   Pt reports:I am using my dilator- inserting it in and out with minimal to no pain. Having constipation today due to what I have been eating    OBJECTIVE cx           Manual: 8 min: external bulbocavernosus and ischiocavernosus on L, external transverse perineum and PF mm and obturator internus mm,   NMR: relaxation and contraction of pelvic floor mm 40 min including kegels with SEMG and reverse kegels with SEMG and Diaphramatic breathing for improved lateral and back rib expansion, cat- cow and bird dog for improved core endurance and improved pelvic awareness  Education: Discussed progression of plan of care with patient; educated pt in activity modification; reviewed HEP with pt. Pt demonstrated and verbalized understanding of all instruction and was provided with a handout of HEP (see Patient Instructions). Education on breathing, kegels and bowel and bladder program     ASSESSMENT      Pt demo improved pelvic floor mm relaxation and contraction today on SEMG. Pt demo shallow breathing pattern and educated on importance of proper breathing for pelvic floor mm activation and to decrease pressure in low back, pelvis and neck  PLAN     Continue with established Plan of Care, working toward established PT goals.

## 2019-09-30 ENCOUNTER — CLINICAL SUPPORT (OUTPATIENT)
Dept: REHABILITATION | Facility: HOSPITAL | Age: 29
End: 2019-09-30
Attending: OBSTETRICS & GYNECOLOGY
Payer: MEDICAID

## 2019-09-30 DIAGNOSIS — R10.2 PELVIC PAIN IN FEMALE: Primary | ICD-10-CM

## 2019-09-30 PROCEDURE — 97112 NEUROMUSCULAR REEDUCATION: CPT | Performed by: PHYSICAL THERAPIST

## 2019-09-30 PROCEDURE — 97535 SELF CARE MNGMENT TRAINING: CPT | Performed by: PHYSICAL THERAPIST

## 2019-09-30 NOTE — PROGRESS NOTES
See PN in POC  OUTPATIENT PHYSICAL THERAPY DAILY NOTE       Patient: Yumiko Bautista   [facility] #:  [id number]    Diagnosis:   Encounter Diagnosis   Name Primary?    Pelvic pain in female Yes      Date of treatment: 09/30/2019       SUBJECTIVE   Pt reports:I am using my dilator- inserting it in and out with minimal to no pain. Having constipation today due to what I have been eating and I take mobic and had a pain medication due to soreness after last visit. I also take pain meds to reduce pain in my knee when standing at work    OBJECTIVE cx           Manual: 18 min: external bulbocavernosus and ischiocavernosus on L, external transverse perineum and PF mm and obturator internus mm,   NMR: relaxation and contraction of pelvic floor mm 10 min including kegels with SEMG and reverse kegels and PF mm stretches including butterfly, piriformis, Obturator internus, carleen. Bird dog and cat cow also performed for improved pelvic stability and awareness  Education: Discussed progression of plan of care with patient; educated pt in activity modification; reviewed HEP with pt. Pt demonstrated and verbalized understanding of all instruction and was provided with a handout of HEP (see Patient Instructions). Education on breathing, kegels and bowel and bladder program     ASSESSMENT       Pt with increased resistnace to palpation of internal mm today. Unable to get into deep pelvic mm today. Pt educated on pain science today and side effects of pain and anti inflammatory meds. Pt described stretches today as painful and may have a imcreased nervous system??  PLAN     Continue with established Plan of Care, working toward established PT goals.

## 2019-10-07 ENCOUNTER — CLINICAL SUPPORT (OUTPATIENT)
Dept: REHABILITATION | Facility: HOSPITAL | Age: 29
End: 2019-10-07
Attending: OBSTETRICS & GYNECOLOGY
Payer: MEDICAID

## 2019-10-07 DIAGNOSIS — R10.2 PELVIC PAIN IN FEMALE: Primary | ICD-10-CM

## 2019-10-07 PROCEDURE — 97112 NEUROMUSCULAR REEDUCATION: CPT | Performed by: PHYSICAL THERAPIST

## 2019-10-07 PROCEDURE — 97140 MANUAL THERAPY 1/> REGIONS: CPT | Performed by: PHYSICAL THERAPIST

## 2019-10-07 NOTE — PROGRESS NOTES
See PN in POC  OUTPATIENT PHYSICAL THERAPY DAILY NOTE       Patient: Yumiko Bautista   [facility] #:  [id number]    Diagnosis:   No diagnosis found.   Date of treatment: 10/07/2019       SUBJECTIVE   Pt reports:I went to get tested for allergies but everything came up negative. My dad just got diagnosed with an autoimmune diease. And I have low WBC count. Could I have an autoimmune disorder?    OBJECTIVE cx           Manual: 10 min: external bulbocavernosus and ischiocavernosus on L, external transverse perineum and PF mm and obturator internus mm,     NMR: relaxation and contraction of pelvic floor mm 40 min including kegels with SEMG and reverse kegels and PF mm stretches including butterfly, piriformis, Obturator internus, carleen. Bird dog and cat cow also performed for improved pelvic stability and awareness. CALM with 25 min mediation for progressive mm relaxation before manual today    Education: Discussed progression of plan of care with patient; educated pt in activity modification; reviewed HEP with pt. Pt demonstrated and verbalized understanding of all instruction and was provided with a handout of HEP (see Patient Instructions). Education on breathing, kegels and bowel and bladder program     ASSESSMENT       Pt educated on reducing stress and continuing with PF mm strengthen and relaxing today. I advised her to seek care from PCP regarding her concerns about an autoimmune disorder  PLAN     Continue with established Plan of Care, working toward established PT goals.

## 2019-10-28 ENCOUNTER — CLINICAL SUPPORT (OUTPATIENT)
Dept: REHABILITATION | Facility: HOSPITAL | Age: 29
End: 2019-10-28
Attending: OBSTETRICS & GYNECOLOGY
Payer: MEDICAID

## 2019-10-28 DIAGNOSIS — R10.2 PELVIC PAIN IN FEMALE: Primary | ICD-10-CM

## 2019-10-28 PROCEDURE — 97140 MANUAL THERAPY 1/> REGIONS: CPT | Performed by: PHYSICAL THERAPIST

## 2019-10-28 PROCEDURE — 97535 SELF CARE MNGMENT TRAINING: CPT | Performed by: PHYSICAL THERAPIST

## 2019-10-28 PROCEDURE — 97112 NEUROMUSCULAR REEDUCATION: CPT | Performed by: PHYSICAL THERAPIST

## 2019-10-28 NOTE — PROGRESS NOTES
See PN in POC  OUTPATIENT PHYSICAL THERAPY DAILY NOTE       Patient: Yumiko Bautista   [facility] #:  [id number]    Diagnosis:   No diagnosis found.   Date of treatment: 10/28/2019       SUBJECTIVE   Pt reports:I have been taking miralax daily and it has helped my constipation and frequent voiding.  I have not been using my dilator and I had sex a week ago and it was very painful.     OBJECTIVE cx           Manual: 10 min: external bulbocavernosus and ischiocavernosus on L, external transverse perineum and PF mm and obturator internus mm,     NMR: relaxation and contraction of pelvic floor mm 40 min including kegels with SEMG and reverse kegels and PF mm stretches including butterfly, piriformis, Obturator internus, carleen. Bird dog and cat cow also performed for improved pelvic stability and awareness. CALM with 25 min mediation for progressive mm relaxation before manual today    Education: Discussed progression of plan of care with patient; educated pt in activity modification; reviewed HEP with pt. Pt demonstrated and verbalized understanding of all instruction and was provided with a handout of HEP (see Patient Instructions). Education on breathing, kegels and bowel and bladder program     ASSESSMENT       Pt educated on reducing stress and continuing with PF mm strengthen and relaxing today. Pt motivated to continue with dilators and will return in 2 weeks  PLAN     Continue with established Plan of Care, working toward established PT goals.

## 2019-11-11 ENCOUNTER — CLINICAL SUPPORT (OUTPATIENT)
Dept: REHABILITATION | Facility: HOSPITAL | Age: 29
End: 2019-11-11
Attending: OBSTETRICS & GYNECOLOGY
Payer: MEDICAID

## 2019-11-11 DIAGNOSIS — R10.2 PELVIC PAIN IN FEMALE: Primary | ICD-10-CM

## 2019-11-11 PROCEDURE — 97112 NEUROMUSCULAR REEDUCATION: CPT | Performed by: PHYSICAL THERAPIST

## 2019-11-11 NOTE — PROGRESS NOTES
See PN in POC  OUTPATIENT PHYSICAL THERAPY DAILY NOTE       Patient: Yumiko Bautista   [facility] #:  [id number]    Diagnosis:   No diagnosis found.   Date of treatment: 11/11/2019       SUBJECTIVE   Pt reports:I stopped my miralax and I have not been drinking my water and eating fried food so I have been very badly constipated. I have not been using my dilator either    OBJECTIVE cx           Manual: 10 min: external bulbocavernosus and ischiocavernosus on L, external transverse perineum and PF mm and obturator internus mm,     NMR: relaxation and contraction of pelvic floor mm 40 min including kegels with SEMG and reverse kegels and PF mm stretches including butterfly, piriformis, Obturator internus, carleen. Bird dog and cat cow also performed for improved pelvic stability and awareness. CALM with 25 min mediation for progressive mm relaxation before manual today    Education: Discussed progression of plan of care with patient; educated pt in activity modification; reviewed HEP with pt. Pt demonstrated and verbalized understanding of all instruction and was provided with a handout of HEP (see Patient Instructions). Education on breathing, kegels and bowel and bladder program     ASSESSMENT       Pt educated on reducing stress and continuing with PF mm strengthen and relaxing today.constipation playing a large role in pelvic pain with penetration and unable to get dilator into pelvis due to possible prolapse. Pt does have difficulty relaxing PF mm during insertion of dilator and need VC and TC to reduce tension in pelvis  PLAN     Continue with established Plan of Care, working toward established PT goals.

## 2019-11-18 ENCOUNTER — CLINICAL SUPPORT (OUTPATIENT)
Dept: REHABILITATION | Facility: HOSPITAL | Age: 29
End: 2019-11-18
Attending: OBSTETRICS & GYNECOLOGY
Payer: MEDICAID

## 2019-11-18 DIAGNOSIS — R10.2 PELVIC PAIN IN FEMALE: Primary | ICD-10-CM

## 2019-11-18 PROCEDURE — 97112 NEUROMUSCULAR REEDUCATION: CPT | Performed by: PHYSICAL THERAPIST

## 2019-11-18 NOTE — PROGRESS NOTES
See PN in POC  OUTPATIENT PHYSICAL THERAPY DAILY NOTE       Patient: Yumiko Bautista   [facility] #:  [id number]    Diagnosis:   Encounter Diagnosis   Name Primary?    Pelvic pain in female Yes      Date of treatment: 11/18/2019       SUBJECTIVE   Pt reports:I have been taking my mirilax more frequently and have been less constipated, however, I feel bloated and constipated again today. I did not make a BM today despite the Urge to go. I did have less painful intercourse saturday night    OBJECTIVE cx           Manual: 15 min: external bulbocavernosus and ischiocavernosus on L, external transverse perineum and PF mm and obturator internus mm, and interanl PF mm manual with contract and relax training for mm relaxation     NMR: relaxation and contraction of pelvic floor mm 40 min including kegels with SEMG and reverse kegels and PF mm stretches including butterfly, piriformis, Obturator internus, acrleen. Bird dog and cat cow also performed for improved pelvic stability and awareness. CALM with 25 min mediation for progressive mm relaxation before manual today    Education: Discussed progression of plan of care with patient; educated pt in activity modification; reviewed HEP with pt. Pt demonstrated and verbalized understanding of all instruction and was provided with a handout of HEP (see Patient Instructions). Education on breathing, kegels and bowel and bladder program     ASSESSMENT       Pt needs lots of VC for mm reaxation. PF mm tension noted in deep PF mm today but overall, is improving. I having less anxiety with interanl PF mm work.  PLAN     Continue with established Plan of Care, working toward established PT goals.

## 2019-12-03 ENCOUNTER — TELEPHONE (OUTPATIENT)
Dept: REHABILITATION | Facility: HOSPITAL | Age: 29
End: 2019-12-03

## 2020-01-10 ENCOUNTER — CLINICAL SUPPORT (OUTPATIENT)
Dept: REHABILITATION | Facility: HOSPITAL | Age: 30
End: 2020-01-10
Attending: OBSTETRICS & GYNECOLOGY
Payer: MEDICAID

## 2020-01-10 DIAGNOSIS — R10.2 PELVIC PAIN IN FEMALE: Primary | ICD-10-CM

## 2020-01-10 PROCEDURE — 97535 SELF CARE MNGMENT TRAINING: CPT | Performed by: PHYSICAL THERAPIST

## 2020-01-10 PROCEDURE — 97112 NEUROMUSCULAR REEDUCATION: CPT | Performed by: PHYSICAL THERAPIST

## 2020-01-10 NOTE — PROGRESS NOTES
See PN in POC  OUTPATIENT PHYSICAL THERAPY DAILY NOTE       Patient: Yumiko Bautista   [facility] #:  [id number]    Diagnosis:   No diagnosis found.   Date of treatment: 01/10/2020       SUBJECTIVE   Pt reports:I have been under a lot of stress lately due to a family member dying right now. I don't think I want to do therapy today. I have been trying to release my mm but I am having a hard time and I stopped my miralax cause I ran out nad did not get anymore and I have been constipated    OBJECTIVE cx           NMR: relaxation and of PF mm and reduce sympathetic nervous system for 15 min    Education: Discussed progression of plan of care with patient; educated pt in activity modification; reviewed HEP with pt. Pt demonstrated and verbalized understanding of all instruction and was provided with a handout of HEP (see Patient Instructions). Education on miralax and bowel program to reduce PF mm tension and improve dysfunction of PF mm 8 min    ASSESSMENT      Pt reported less tension in her body and will return to PF therapy after familial issues  PLAN     Continue with established Plan of Care, working toward established PT goals.

## 2020-02-05 ENCOUNTER — CLINICAL SUPPORT (OUTPATIENT)
Dept: REHABILITATION | Facility: HOSPITAL | Age: 30
End: 2020-02-05
Attending: OBSTETRICS & GYNECOLOGY
Payer: MEDICAID

## 2020-02-05 DIAGNOSIS — R10.2 PELVIC PAIN IN FEMALE: Primary | ICD-10-CM

## 2020-02-05 PROCEDURE — 97112 NEUROMUSCULAR REEDUCATION: CPT | Performed by: PHYSICAL THERAPIST

## 2020-02-05 PROCEDURE — 97140 MANUAL THERAPY 1/> REGIONS: CPT | Performed by: PHYSICAL THERAPIST

## 2020-02-05 PROCEDURE — 97535 SELF CARE MNGMENT TRAINING: CPT | Performed by: PHYSICAL THERAPIST

## 2020-02-05 NOTE — PROGRESS NOTES
See PN in POC  OUTPATIENT PHYSICAL THERAPY DAILY NOTE       Patient: Yumiko Bautista   [facility] #:  [id number]    Diagnosis:   Encounter Diagnosis   Name Primary?    Pelvic pain in female Yes      Date of treatment: 02/05/2020       SUBJECTIVE   Pt reports:I have been less constipated lately. I have not used my dilator in awhile  Pain: abdominals 5/10 due to constipation  OBJECTIVE    Treatment time: 45 min    Manual: MFR at transverse perineum mm for 10 min      NMR: relaxation and of PF mm and reduce sympathetic nervous system for 15 min    Education: Discussed progression of plan of care with patient; educated pt in activity modification; reviewed HEP with pt. Pt demonstrated and verbalized understanding of all instruction and was provided with a handout of HEP (see Patient Instructions). Education on miralax and bowel program to reduce PF mm tension and improve dysfunction of PF mm and use and progression of dilators 20 min    ASSESSMENT      Pt tolerated tx well today without adverse effects. Pt bowel issue affecting her PF mm function- PT has high tone in PF mm and a lot of fear avoidance slowing her progression. Pt has little motivation to improve her condition at this time.  PLAN     Pt to be d/c next visit with HEP

## 2020-02-28 ENCOUNTER — CLINICAL SUPPORT (OUTPATIENT)
Dept: REHABILITATION | Facility: HOSPITAL | Age: 30
End: 2020-02-28
Attending: OBSTETRICS & GYNECOLOGY
Payer: MEDICAID

## 2020-02-28 DIAGNOSIS — R10.2 PELVIC PAIN IN FEMALE: Primary | ICD-10-CM

## 2020-02-28 PROCEDURE — 97140 MANUAL THERAPY 1/> REGIONS: CPT | Performed by: PHYSICAL THERAPIST

## 2020-03-02 NOTE — PROGRESS NOTES
See PN in POC  OUTPATIENT PHYSICAL THERAPY DAILY NOTE       Patient: Yumiko Bautista   [facility] #:  [id number]    Diagnosis:   No diagnosis found.   Date of treatment: 03/02/2020       SUBJECTIVE   Pt reports:I have been less constipated lately due to taking medications- ducolax but today, I feel constipated again  Pain: abdominals 5/10 due to constipation  OBJECTIVE    Treatment time: 55 min    Manual: MFR at transverse perineum and bowel massage for 20 min      NMR: relaxation and of PF mm and reduce sympathetic nervous system for 15 min    Education: Discussed progression of plan of care with patient; educated pt in activity modification; reviewed HEP with pt. Pt demonstrated and verbalized understanding of all instruction and was provided with a handout of HEP (see Patient Instructions). Education on miralax and bowel program to reduce PF mm tension and improve dysfunction of PF mm and use and progression of dilators 20 min    ASSESSMENT      Pt educated again on dilator use and progression nad becoming constipation free to reduce pain with intercourse. I will see her back in 4-6 weeks and pt to be d/c  PLAN     Pt to be d/c next visit with HEP

## 2020-04-23 PROBLEM — R53.1 WEAKNESS: Status: ACTIVE | Noted: 2020-04-23

## 2020-04-23 PROBLEM — R10.2 PELVIC PAIN IN FEMALE: Status: ACTIVE | Noted: 2020-04-23

## 2020-05-06 PROBLEM — R53.1 WEAKNESS: Status: RESOLVED | Noted: 2020-04-23 | Resolved: 2020-05-06

## 2020-05-06 PROBLEM — R10.2 PELVIC PAIN IN FEMALE: Status: RESOLVED | Noted: 2020-04-23 | Resolved: 2020-05-06

## 2020-06-29 ENCOUNTER — OFFICE VISIT (OUTPATIENT)
Dept: OBSTETRICS AND GYNECOLOGY | Facility: CLINIC | Age: 30
End: 2020-06-29
Payer: MEDICAID

## 2020-06-29 VITALS
HEIGHT: 65 IN | WEIGHT: 161.63 LBS | DIASTOLIC BLOOD PRESSURE: 98 MMHG | SYSTOLIC BLOOD PRESSURE: 140 MMHG | BODY MASS INDEX: 26.93 KG/M2

## 2020-06-29 DIAGNOSIS — N92.6 IRREGULAR MENSES: Primary | ICD-10-CM

## 2020-06-29 PROCEDURE — 99213 OFFICE O/P EST LOW 20 MIN: CPT | Mod: S$PBB,,, | Performed by: NURSE PRACTITIONER

## 2020-06-29 PROCEDURE — 99213 PR OFFICE/OUTPT VISIT, EST, LEVL III, 20-29 MIN: ICD-10-PCS | Mod: S$PBB,,, | Performed by: NURSE PRACTITIONER

## 2020-06-29 PROCEDURE — 99999 PR PBB SHADOW E&M-EST. PATIENT-LVL III: ICD-10-PCS | Mod: PBBFAC,,, | Performed by: NURSE PRACTITIONER

## 2020-06-29 PROCEDURE — 99213 OFFICE O/P EST LOW 20 MIN: CPT | Mod: PBBFAC | Performed by: NURSE PRACTITIONER

## 2020-06-29 PROCEDURE — 99999 PR PBB SHADOW E&M-EST. PATIENT-LVL III: CPT | Mod: PBBFAC,,, | Performed by: NURSE PRACTITIONER

## 2020-06-29 RX ORDER — LAMOTRIGINE 100 MG/1
100 TABLET ORAL
COMMUNITY
Start: 2020-06-17 | End: 2021-03-05

## 2020-06-29 RX ORDER — LAMOTRIGINE 25 MG/1
TABLET ORAL
COMMUNITY
Start: 2020-05-15 | End: 2020-06-29

## 2020-06-29 RX ORDER — OLANZAPINE 2.5 MG/1
TABLET ORAL
COMMUNITY
Start: 2020-05-19 | End: 2020-06-29

## 2020-06-29 RX ORDER — MINERAL OIL
180 ENEMA (ML) RECTAL DAILY
COMMUNITY
End: 2021-03-05

## 2020-06-29 RX ORDER — LEVONORGESTREL AND ETHINYL ESTRADIOL 0.1-0.02MG
1 KIT ORAL DAILY
Qty: 28 TABLET | Refills: 12 | Status: SHIPPED | OUTPATIENT
Start: 2020-06-29 | End: 2020-08-13

## 2020-06-29 RX ORDER — KETOROLAC TROMETHAMINE 10 MG/1
10 TABLET, FILM COATED ORAL EVERY 6 HOURS PRN
COMMUNITY
Start: 2020-06-06 | End: 2020-06-29

## 2020-06-29 NOTE — PROGRESS NOTES
Yumiko Bautista is a 29 y.o. female  presents for irregular menses - pt has had issues in past.  Was placed on ocp which she took for one month last year and caused heavier bleeding and acne. Dr. Garcia changed to higher dose estrogen ocp but pt choose not to start it.  She is having spotting about a week before  Cycle then cycles for a week every month.   Pt has been seeing PT for pelvic floor issues unable to tolerate any thing in vaginal canal - no exam could be done by Dr. Oliveros.  Not sexually active since December.  She is getting some improvement with PT but declines exam today.     LMP: Patient's last menstrual period was 2020 (approximate)..      Past Medical History:   Diagnosis Date    Hypertension     Mental disorder     Bi polar     Past Surgical History:   Procedure Laterality Date    cyst removal       Social History     Socioeconomic History    Marital status: Single     Spouse name: Not on file    Number of children: Not on file    Years of education: Not on file    Highest education level: Not on file   Occupational History    Not on file   Social Needs    Financial resource strain: Not on file    Food insecurity     Worry: Not on file     Inability: Not on file    Transportation needs     Medical: Not on file     Non-medical: Not on file   Tobacco Use    Smoking status: Never Smoker    Smokeless tobacco: Never Used   Substance and Sexual Activity    Alcohol use: No    Drug use: No    Sexual activity: Not Currently   Lifestyle    Physical activity     Days per week: Not on file     Minutes per session: Not on file    Stress: Not on file   Relationships    Social connections     Talks on phone: Not on file     Gets together: Not on file     Attends [employer] service: Not on file     Active member of club or organization: Not on file     Attends meetings of clubs or organizations: Not on file     Relationship status: Not on file   Other Topics Concern    Not on file  "  Social History Narrative    Not on file     Family History   Problem Relation Age of Onset    Hypertension Mother     Hypertension Paternal Grandfather     Ovarian cancer Paternal Grandmother     Hypertension Paternal Grandmother     Hypertension Maternal Grandmother     Hypertension Maternal Grandfather      OB History        0    Para   0    Term   0       0    AB   0    Living   0       SAB   0    TAB   0    Ectopic   0    Multiple   0    Live Births   0                 BP (!) 140/98   Ht 5' 5" (1.651 m)   Wt 73.3 kg (161 lb 9.6 oz)   LMP 2020 (Approximate)   BMI 26.89 kg/m²       ROS:  Per hpi    PHYSICAL EXAM:  APPEARANCE: Well nourished, well developed, in no acute distress.  AFFECT: WNL, alert and oriented x 3  SKIN: No acne or hirsutism  Pt declined  Physical Exam    1. Irregular menses  levonorgestrel-ethinyl estradiol (AVIANE,ALESSE,LESSINA) 0.1-20 mg-mcg per tablet    AND PLAN:    Long discussion had with pt about menstrual cycles, irregularity, and controlling cycle  Will try another ocp and see how responds  Also discussed provera for 10 out of month to see if regulates if does not like ocp use               "

## 2020-06-29 NOTE — PATIENT INSTRUCTIONS
Understanding Periods  Having a period is a normal, healthy part of becoming and being a woman. A period is the result of a cycle that takes place inside a girls body. This menstrual cycle makes it possible for women to have babies. The cycle begins with the first day of bleeding. In the middle of the cycle, ovulation occurs. This is when an egg is released and begins its journey from the ovary to the uterus.    An egg is released  · During each cycle, 1 egg grows and is released from an ovary. It finds its way to the fallopian tube.  The egg travels through a tube  · The egg moves through the fallopian tube toward the uterus. (If the egg and a mans sperm meet here, a woman becomes pregnant.)  The lining thickens  · The lining of the uterus grows thicker. This lining is made up of blood, tissue, and fluid. (The lining will nourish a growing baby during pregnancy.)  The egg and lining are shed  · About once a month, the egg and the lining of the uterus are shed through the vagina. This is called a period. (A period does not happen during pregnancy.)  Date Last Reviewed: 5/9/2015  © 9442-9581 [employer]. [address] [place] [address]. All rights reserved. This information is not intended as a substitute for professional medical care. Always follow your healthcare professional's instructions.        Hormones and Your Menstrual Cycle  A woman's menstrual cycle (monthly period) is controlled by changing levels of certain hormones. These hormones travel through the blood. Two hormones, estrogen and progesterone, play a big role in the menstrual cycle. They are produced in the ovaries (where eggs are stored).    The menstrual cycle  Hormones help prepare the uterus for pregnancy. At the start of the cycle, the 2 ovaries produce estrogen. This makes 1 ovary release an egg, and signals the production of progesterone. The egg travels through the fallopian tube. Then it enters the uterus. If the  egg is fertilized, a woman becomes pregnant. If this doesn't happen, the egg is shed along with the uterine lining. This bleeding is called menstruation.  Symptoms you may have  Days 1 to 7  · Menstrual bleeding  · Cramping  · Headache  Days 8 to 14  · Increased and thickened vaginal mucus  · Higher energy  Days 15 to 28  · Bloating  · Tiredness  · Cramping  · Irritability  · Breast tenderness   Date Last Reviewed: 5/13/2015  © 4161-6801 [employer]. [address] [place] [address]. All rights reserved. This information is not intended as a substitute for professional medical care. Always follow your healthcare professional's instructions.        Understanding the Normal Menstrual Cycle  Having a period (menstruation) is a normal, healthy part of being a woman. Its also part of the menstrual cycle, a process that makes it possible for women to become pregnant. The first day of your period is the first day of your menstrual cycle.   A woman who has irregular cycles can become pregnant during bleeding. This may not be a true menstrual period.   An egg is released    Eggs are female reproductive cells stored in the ovaries. During each cycle, a woman's hormones trigger an egg, (usually 1), to mature and be released from an ovary. This is called ovulation. The egg then travels from the ovary to a fallopian tube.  The egg travels through a tube  The egg moves through the fallopian tube toward the uterus. If sperm are present in the tube, the egg may be fertilized, and pregnancy could result.  The uterine lining grows thicker  The lining of the uterus is made up of blood, tissue, and fluid. During each cycle, hormones cause the lining to thicken. This helps prepare the uterus to receive and nourish a fertilized egg.   The egg and lining are shed  If pregnancy doesnt happen, the egg and thickened lining of the uterus are no longer needed. They are then shed through the vagina. This is called a  menstrual period.  How long is each cycle?  It is normal for a cycle to take 21 to 34 days. For teenagers, the time between periods might be as much as 45 days. For adults, it will be around a month from the first day of one period to the first day of the next. Thats why you may hear women talk about a monthly cycle, even though cycle length can vary from one month to another, and anywhere from 3 to 5 weeks is normal. Not everyone has a 28-day cycle.    How long does a period last?  Its normal for a period to last 2 to 7 days. Talk to your healthcare provider if your period lasts longer than 7 days for 2 cycles in a row.  Date Last Reviewed: 12/1/2016  © 6378-8206 [employer]. [address] [place] [address], [place] [address]. All rights reserved. This information is not intended as a substitute for professional medical care. Always follow your healthcare professional's instructions.

## 2020-06-30 ENCOUNTER — CLINICAL SUPPORT (OUTPATIENT)
Dept: REHABILITATION | Facility: HOSPITAL | Age: 30
End: 2020-06-30
Payer: MEDICAID

## 2020-06-30 DIAGNOSIS — R10.2 PELVIC PAIN IN FEMALE: Primary | ICD-10-CM

## 2020-06-30 DIAGNOSIS — R53.1 WEAKNESS: ICD-10-CM

## 2020-06-30 PROCEDURE — 97164 PT RE-EVAL EST PLAN CARE: CPT | Mod: 59 | Performed by: PHYSICAL THERAPIST

## 2020-06-30 PROCEDURE — 97140 MANUAL THERAPY 1/> REGIONS: CPT | Performed by: PHYSICAL THERAPIST

## 2020-06-30 PROCEDURE — 97535 SELF CARE MNGMENT TRAINING: CPT | Performed by: PHYSICAL THERAPIST

## 2020-06-30 NOTE — PLAN OF CARE
Outpatient Therapy Updated Plan of Care     Visit Date: 6/30/2020  Name: Yumiko Bautista  Clinic Number: [id number]    Therapy Diagnosis:   Encounter Diagnoses   Name Primary?    Pelvic pain in female Yes    Weakness      Physician: Darlene Oliveros*    Physician Orders: vaginismus  Medical Diagnosis: vaginismus  Evaluation Date: 3/8/19    Total Visits Receive: 23  Cancelled Visits: 2  No Show Visits: 0    Current Certification Period:  1/10/20 to 12/31/20  Precautions:  none  Visits from Evaluation Date:  5/20  Functional Level Prior to Evaluation:  Constipation, frequent urination and pain with vaginal penetration    Subjective     Update: since COVID, I have been working on caring better for myself, so I have been having less back and hip pain, constipation because I am working out and listening to my body when I need to have a BM. I have not had intercourse so I'm not sure if I have pain with penetration becauae I have not been using my dilators. I had a OB appt yesterday but they did not do an exam because of my pelvic pain. I am not having frequent urination since last visit as well    Objective     Update: Pt demo poor/non visblie voluntary contraction of PF mm during external exam and poor relaxation and bulging of PF mm. SEMG with external electrodes revealed low PF mm resting tone and low net rise with activation of PFmm. external palpation of R levator ani moderate tenderness and tenderness at transverse perineal body    Assessment     Update: Pt progress very slow due to non compliance with HEP and therapy due to personal reasons and to temporary clinic closure    Previous Short Term Goals Status: Pt with more normalized type 4 BM daily  Long Term Goal Status:   continue per initial plan of care.  Reasons for Recertification of Therapy:   Pt progressing and will benefit from 2-4 more visits to address pelvic pain    Plan     Updated Certification Period: 6/30/2020 to 7/30/20  Recommended Treatment  Plan: 1 times per week every 2-3  weeks: Manual Therapy, Neuromuscular Re-ed, Patient Education, Self Care and Therapeutic Exercise  Other Recommendations:     Reema Wade, PT  6/30/2020      I CERTIFY THE NEED FOR THESE SERVICES FURNISHED UNDER THIS PLAN OF TREATMENT AND WHILE UNDER MY CARE    Physician's comments:        Physician's Signature: ___________________________________________________

## 2020-08-06 ENCOUNTER — TELEPHONE (OUTPATIENT)
Dept: RHEUMATOLOGY | Facility: CLINIC | Age: 30
End: 2020-08-06

## 2020-08-06 ENCOUNTER — CLINICAL SUPPORT (OUTPATIENT)
Dept: REHABILITATION | Facility: HOSPITAL | Age: 30
End: 2020-08-06
Payer: MEDICAID

## 2020-08-06 DIAGNOSIS — R10.2 PELVIC PAIN IN FEMALE: Primary | ICD-10-CM

## 2020-08-06 DIAGNOSIS — M25.50 ARTHRALGIA, UNSPECIFIED JOINT: Primary | ICD-10-CM

## 2020-08-06 PROCEDURE — 97164 PT RE-EVAL EST PLAN CARE: CPT | Performed by: PHYSICAL THERAPIST

## 2020-08-06 PROCEDURE — 97110 THERAPEUTIC EXERCISES: CPT | Performed by: PHYSICAL THERAPIST

## 2020-08-06 NOTE — PROGRESS NOTES
Outpatient Therapy Updated Plan of Care      Visit Date: 6/30/2020  Name: Yumiko Bautista  Clinic Number: [id number]     Therapy Diagnosis:        Encounter Diagnoses   Name Primary?    Pelvic pain in female Yes    Weakness       Physician: Darlene Oliveros*     Physician Orders: vaginismus  Medical Diagnosis: vaginismus  Evaluation Date: 3/8/19     Total Visits Receive: 24  Cancelled Visits: 2  No Show Visits: 0     Current Certification Period:  1/10/20 to 12/31/20  Precautions:  none  Visits from Evaluation Date:  5/20  Functional Level Prior to Evaluation:  Constipation, frequent urination and pain with vaginal penetration     Subjective      Update: I'm having so many health issues and I am frustrated. I have skin issues, menstruation issues, pelvic pain, constipation and joint pains. Something is going on and I feel I may have what my dad has- an autoimmune disorder. I am always sick too- with sinus and congestion and very very fatrigued       Objective      Update: Pt demo poor/non visblie voluntary contraction of PF mm during external exam and poor relaxation and bulging of PF mm. SEMG with external electrodes revealed low PF mm resting tone and low net rise with activation of PFmm. external palpation of R levator ani moderate tenderness and tenderness at transverse perineal body     Self care: 15 min: diet of elimination, exercises, reducing stress    Re eval: 15 min    Total tx time: 30 min  Treatment time in and out: 1130-1200pm  Assessment      Update: Pt progress very slow due to non compliance with HEP and therapy due to personal reasons and to temporary clinic closure. I feel, Patient may benefit from a Rheumatologist appointment due to s/s of possible autoimmune disorder     Joint pains bilateral, skin issues, chronic fatigue, bowel, bladder nad reproductive issues and a family histoty of Automimmune disorders  Plan      Other Recommendations: Pt may benefit from rheumatologist  consult     Reema Wade, PT  Reema Wade, PT, DPT, MTC, OCS  Board certified Orthopaedic Specialist  Pelvic   Department of Physical Therapy  [phone]          I CERTIFY THE NEED FOR THESE SERVICES FURNISHED UNDER THIS PLAN OF TREATMENT AND WHILE UNDER MY CARE     Physician's comments:

## 2020-08-07 ENCOUNTER — TELEPHONE (OUTPATIENT)
Dept: RHEUMATOLOGY | Facility: CLINIC | Age: 30
End: 2020-08-07

## 2020-08-07 NOTE — TELEPHONE ENCOUNTER
Np consult scheduled on 8.13.20 with Dr. BRYANT at 5:15pm at [facility], pt Verbalized understanding.

## 2020-08-13 ENCOUNTER — OFFICE VISIT (OUTPATIENT)
Dept: RHEUMATOLOGY | Facility: CLINIC | Age: 30
End: 2020-08-13
Payer: MEDICAID

## 2020-08-13 VITALS
HEART RATE: 80 BPM | DIASTOLIC BLOOD PRESSURE: 107 MMHG | SYSTOLIC BLOOD PRESSURE: 155 MMHG | BODY MASS INDEX: 27.22 KG/M2 | HEIGHT: 65 IN | WEIGHT: 163.38 LBS

## 2020-08-13 DIAGNOSIS — E55.9 VITAMIN D DEFICIENCY: ICD-10-CM

## 2020-08-13 DIAGNOSIS — M25.50 POLYARTHRALGIA: Primary | ICD-10-CM

## 2020-08-13 DIAGNOSIS — R10.2 PELVIC PAIN: ICD-10-CM

## 2020-08-13 DIAGNOSIS — R21 RASH: ICD-10-CM

## 2020-08-13 PROCEDURE — 99999 PR PBB SHADOW E&M-EST. PATIENT-LVL III: CPT | Mod: PBBFAC,,, | Performed by: INTERNAL MEDICINE

## 2020-08-13 PROCEDURE — 99213 OFFICE O/P EST LOW 20 MIN: CPT | Mod: PBBFAC | Performed by: INTERNAL MEDICINE

## 2020-08-13 PROCEDURE — 99205 OFFICE O/P NEW HI 60 MIN: CPT | Mod: S$PBB,,, | Performed by: INTERNAL MEDICINE

## 2020-08-13 PROCEDURE — 99205 PR OFFICE/OUTPT VISIT, NEW, LEVL V, 60-74 MIN: ICD-10-PCS | Mod: S$PBB,,, | Performed by: INTERNAL MEDICINE

## 2020-08-13 PROCEDURE — 99999 PR PBB SHADOW E&M-EST. PATIENT-LVL III: ICD-10-PCS | Mod: PBBFAC,,, | Performed by: INTERNAL MEDICINE

## 2020-08-13 RX ORDER — FLUTICASONE PROPIONATE 50 MCG
2 SPRAY, SUSPENSION (ML) NASAL DAILY
COMMUNITY
Start: 2020-07-29 | End: 2021-03-05

## 2020-08-13 NOTE — LETTER
August 16, 2020      Reema Aaron, [facility]  [address] [place] [id number]           [facility]  [address]  [place] [address]  Phone: [phone]  Fax: [phone]          Patient: Yumiko Bautista   MR Number: [id number]   YOB: 1990   Date of Visit: 8/13/2020       Dear Reema Aaron:    Thank you for referring Yumiko Bautista to me for evaluation. Attached you will find relevant portions of my assessment and plan of care.    If you have questions, please do not hesitate to call me. I look forward to following Yumiko Bautista along with you.    Sincerely,    Jayjay Martinez MD    Enclosure  CC:  No Recipients    If you would like to receive this communication electronically, please contact [email or web address] or [phone] to request more information on [employer] access.    For providers and/or their staff who would like to refer a patient to [facility], please contact us through our one-stop-shop provider referral line, [facility], [place], at [phone].    If you feel you have received this communication in error or would no longer like to receive these types of communications, please e-mail [email or web address]

## 2020-08-14 ENCOUNTER — APPOINTMENT (OUTPATIENT)
Dept: LAB | Facility: HOSPITAL | Age: 30
End: 2020-08-14
Payer: MEDICAID

## 2020-08-14 ENCOUNTER — LAB VISIT (OUTPATIENT)
Dept: LAB | Facility: HOSPITAL | Age: 30
End: 2020-08-14
Attending: INTERNAL MEDICINE
Payer: MEDICAID

## 2020-08-14 DIAGNOSIS — R10.2 PELVIC PAIN: ICD-10-CM

## 2020-08-14 DIAGNOSIS — M25.50 POLYARTHRALGIA: ICD-10-CM

## 2020-08-14 DIAGNOSIS — R21 RASH: ICD-10-CM

## 2020-08-14 LAB
ALBUMIN SERPL BCP-MCNC: 4 G/DL (ref 3.5–5.2)
ALP SERPL-CCNC: 55 U/L (ref 55–135)
ALT SERPL W/O P-5'-P-CCNC: 17 U/L (ref 10–44)
ANION GAP SERPL CALC-SCNC: 8 MMOL/L (ref 8–16)
AST SERPL-CCNC: 17 U/L (ref 10–40)
BASOPHILS # BLD AUTO: 0.02 K/UL (ref 0–0.2)
BASOPHILS NFR BLD: 0.5 % (ref 0–1.9)
BILIRUB SERPL-MCNC: 0.8 MG/DL (ref 0.1–1)
BUN SERPL-MCNC: 11 MG/DL (ref 6–20)
CALCIUM SERPL-MCNC: 9.6 MG/DL (ref 8.7–10.5)
CHLORIDE SERPL-SCNC: 106 MMOL/L (ref 95–110)
CK SERPL-CCNC: 74 U/L (ref 20–180)
CO2 SERPL-SCNC: 26 MMOL/L (ref 23–29)
CREAT SERPL-MCNC: 1 MG/DL (ref 0.5–1.4)
CRP SERPL-MCNC: 3.6 MG/L (ref 0–8.2)
DIFFERENTIAL METHOD: ABNORMAL
EOSINOPHIL # BLD AUTO: 0.1 K/UL (ref 0–0.5)
EOSINOPHIL NFR BLD: 1.6 % (ref 0–8)
ERYTHROCYTE [DISTWIDTH] IN BLOOD BY AUTOMATED COUNT: 12.7 % (ref 11.5–14.5)
ERYTHROCYTE [SEDIMENTATION RATE] IN BLOOD BY WESTERGREN METHOD: 2 MM/HR (ref 0–20)
EST. GFR  (AFRICAN AMERICAN): >60 ML/MIN/1.73 M^2
EST. GFR  (NON AFRICAN AMERICAN): >60 ML/MIN/1.73 M^2
GLUCOSE SERPL-MCNC: 100 MG/DL (ref 70–110)
HCT VFR BLD AUTO: 41.3 % (ref 37–48.5)
HGB BLD-MCNC: 13.7 G/DL (ref 12–16)
IMM GRANULOCYTES # BLD AUTO: 0.01 K/UL (ref 0–0.04)
IMM GRANULOCYTES NFR BLD AUTO: 0.3 % (ref 0–0.5)
LYMPHOCYTES # BLD AUTO: 1.4 K/UL (ref 1–4.8)
LYMPHOCYTES NFR BLD: 36.7 % (ref 18–48)
MCH RBC QN AUTO: 27.8 PG (ref 27–31)
MCHC RBC AUTO-ENTMCNC: 33.2 G/DL (ref 32–36)
MCV RBC AUTO: 84 FL (ref 82–98)
MONOCYTES # BLD AUTO: 0.2 K/UL (ref 0.3–1)
MONOCYTES NFR BLD: 6.5 % (ref 4–15)
NEUTROPHILS # BLD AUTO: 2 K/UL (ref 1.8–7.7)
NEUTROPHILS NFR BLD: 54.4 % (ref 38–73)
NRBC BLD-RTO: 0 /100 WBC
PLATELET # BLD AUTO: 260 K/UL (ref 150–350)
PMV BLD AUTO: 10.1 FL (ref 9.2–12.9)
POTASSIUM SERPL-SCNC: 4 MMOL/L (ref 3.5–5.1)
PROT SERPL-MCNC: 7.7 G/DL (ref 6–8.4)
RBC # BLD AUTO: 4.93 M/UL (ref 4–5.4)
SODIUM SERPL-SCNC: 140 MMOL/L (ref 136–145)
WBC # BLD AUTO: 3.71 K/UL (ref 3.9–12.7)

## 2020-08-14 PROCEDURE — 86200 CCP ANTIBODY: CPT

## 2020-08-14 PROCEDURE — 82085 ASSAY OF ALDOLASE: CPT

## 2020-08-14 PROCEDURE — 81374 HLA I TYPING 1 ANTIGEN LR: CPT | Mod: PO

## 2020-08-14 PROCEDURE — 86431 RHEUMATOID FACTOR QUANT: CPT

## 2020-08-14 PROCEDURE — 36415 COLL VENOUS BLD VENIPUNCTURE: CPT

## 2020-08-14 PROCEDURE — 86038 ANTINUCLEAR ANTIBODIES: CPT

## 2020-08-14 PROCEDURE — 82306 VITAMIN D 25 HYDROXY: CPT

## 2020-08-14 PROCEDURE — 85651 RBC SED RATE NONAUTOMATED: CPT

## 2020-08-14 PROCEDURE — 86140 C-REACTIVE PROTEIN: CPT

## 2020-08-14 PROCEDURE — 85025 COMPLETE CBC W/AUTO DIFF WBC: CPT

## 2020-08-14 PROCEDURE — 82550 ASSAY OF CK (CPK): CPT

## 2020-08-14 PROCEDURE — 80053 COMPREHEN METABOLIC PANEL: CPT

## 2020-08-15 LAB
25(OH)D3+25(OH)D2 SERPL-MCNC: 10 NG/ML (ref 30–96)
CCP AB SER IA-ACNC: <0.5 U/ML
RHEUMATOID FACT SERPL-ACNC: <10 IU/ML (ref 0–15)

## 2020-08-16 RX ORDER — ERGOCALCIFEROL 1.25 MG/1
50000 CAPSULE ORAL
Qty: 12 CAPSULE | Refills: 3 | Status: SHIPPED | OUTPATIENT
Start: 2020-08-16 | End: 2021-08-11 | Stop reason: SDUPTHER

## 2020-08-17 LAB — ALDOLASE SERPL-CCNC: 3.6 U/L (ref 1.2–7.6)

## 2020-08-17 NOTE — PROGRESS NOTES
RHEUMATOLOGY CLINIC INITIAL VISIT    Reason for referral:-  Referred for evaluation of groin pain    Chief complaints:-   Groin pain.    HPI:-  Yumiko Miguel a 29 y.o. pleasant female comes in for an initial visit with above chief complaints.  She has been following up with gynecologist and was referred to physical therapy for pelvic pain.  She has been working with physical therapist for her pelvic pain.  During the therapy sessions she was found to have few tender points and was pointing towards her groin-the hip joint with more pain.  She was referred to Rheumatology to rule out any underlying inflammatory autoimmune disease.  She denies photosensitive malar rash, sicca syndrome, Raynaud's phenomenon, treatment resistant headaches, small joint swelling or large joint stiffness, seizures, psychosis    Review of Systems   Constitutional: Negative for chills and fever.   HENT: Negative for congestion and sore throat.    Eyes: Negative for blurred vision and redness.   Respiratory: Negative for cough and shortness of breath.    Cardiovascular: Negative for chest pain and leg swelling.   Gastrointestinal: Negative for abdominal pain.   Genitourinary: Positive for flank pain. Negative for dysuria.   Musculoskeletal: Positive for joint pain. Negative for back pain, falls, myalgias and neck pain.   Skin: Positive for rash.   Neurological: Negative for headaches.   Endo/Heme/Allergies: Does not bruise/bleed easily.   Psychiatric/Behavioral: Negative for memory loss. The patient does not have insomnia.        Past Medical History:   Diagnosis Date    Hypertension     Mental disorder     Bi polar       Past Surgical History:   Procedure Laterality Date    cyst removal          Social History     Tobacco Use    Smoking status: Never Smoker    Smokeless tobacco: Never Used   Substance Use Topics    Alcohol use: No    Drug use: No       Family History   Problem Relation Age of Onset    Hypertension Mother      "Hypertension Paternal Grandfather     Ovarian cancer Paternal Grandmother     Hypertension Paternal Grandmother     Hypertension Maternal Grandmother     Hypertension Maternal Grandfather        Review of patient's allergies indicates:  No Known Allergies    Vitals:    08/13/20 1713   BP: (!) 155/107   Pulse: 80   Weight: 74.1 kg (163 lb 5.8 oz)   Height: 5' 5" (1.651 m)   PainSc:   5   PainLoc: Groin       Physical Exam   Constitutional: She is oriented to person, place, and time and well-developed, well-nourished, and in no distress. No distress.   HENT:   Head: Normocephalic.   Mouth/Throat: Oropharynx is clear and moist.   Eyes: Pupils are equal, round, and reactive to light. Conjunctivae and EOM are normal.   Neck: Normal range of motion. Neck supple.   Cardiovascular: Normal rate and intact distal pulses.   Pulmonary/Chest: Effort normal. No respiratory distress.   Abdominal: Soft. There is no abdominal tenderness.   Musculoskeletal:      Comments: Few tender points.  No synovitis over small joints of hands or feet.  No effusion over large joints.   Neurological: She is alert and oriented to person, place, and time. No cranial nerve deficit.   Skin: Skin is warm. No rash noted. No erythema.   Psychiatric: Mood and affect normal.   Nursing note and vitals reviewed.      Radiographs:-  Independent visualization of images done.    Old and Outside medical records :-  Reviewed old and all outside medical records available in Care Everywhere.    Medication List with Changes/Refills   Current Medications    ALBUTEROL (PROVENTIL/VENTOLIN HFA) 90 MCG/ACTUATION INHALER    Inhale 2 puffs into the lungs every 6 (six) hours as needed for Wheezing or Shortness of Breath. Rescue    FEXOFENADINE (ALLEGRA) 180 MG TABLET    Take 180 mg by mouth once daily.    FLUTICASONE PROPIONATE (FLONASE) 50 MCG/ACTUATION NASAL SPRAY    2 sprays by Each Nostril route once daily.    LAMOTRIGINE (LAMICTAL) 100 MG TABLET    Take 100 mg by " mouth.    LEVONORGESTREL-ETHINYL ESTRADIOL (AVIANE,ALESSE,LESSINA) 0.1-20 MG-MCG PER TABLET    Take 1 tablet by mouth once daily.       Assessment/Plans:-  1. Polyarthralgia    2. Rash    3. Pelvic pain      · Based on her history and physical examination, pretest probability for any underlying inflammatory autoimmune connective tissue disease is very low.  I do not see any evidence of rheumatoid arthritis, lupus, Sjogren's, undifferentiated connective tissue disease or myositis.  · Nonspecific rash  · Chronic pelvic pain likely secondary to uterine or ovarian etiology than arthritic etiology.  · Check labs to rule out any underlying inflammatory Connective tissue disease.   ·  Advised to follow-up with gynecologist for worsening pelvic pain.  Problem List Items Addressed This Visit     Pelvic pain    Relevant Orders    BRIE Screen w/Reflex    CBC auto differential (Completed)    Cyclic Citrullinated Peptide Antibody, IgG (Completed)    Comprehensive metabolic panel (Completed)    C-Reactive Protein (Completed)    Sedimentation rate (Completed)    Rheumatoid factor (Completed)    HLA B27 Antigen    Polyarthralgia - Primary    Relevant Orders    BRIE Screen w/Reflex    CBC auto differential (Completed)    Cyclic Citrullinated Peptide Antibody, IgG (Completed)    Comprehensive metabolic panel (Completed)    C-Reactive Protein (Completed)    Sedimentation rate (Completed)    Rheumatoid factor (Completed)    HLA B27 Antigen    CK (Completed)    Aldolase    Vitamin D (Completed)    Rash    Relevant Orders    BRIE Screen w/Reflex    CBC auto differential (Completed)    Cyclic Citrullinated Peptide Antibody, IgG (Completed)    Comprehensive metabolic panel (Completed)    C-Reactive Protein (Completed)    Sedimentation rate (Completed)    Rheumatoid factor (Completed)    HLA B27 Antigen          Follow up if symptoms worsen or fail to improve.    Thank you for allowing me to participate in the care Gina GUSTAFSON  Michele.    Disclaimer: This note was prepared using voice recognition system and is likely to have sound alike errors and is not proof read.  Please call me with any questions.

## 2020-08-18 LAB — ANA SER QL IF: NORMAL

## 2020-08-19 LAB
HLA-B27 RELATED AG QL: NEGATIVE
HLA-B27 RELATED AG QL: NORMAL

## 2020-09-04 ENCOUNTER — OFFICE VISIT (OUTPATIENT)
Dept: OBSTETRICS AND GYNECOLOGY | Facility: CLINIC | Age: 30
End: 2020-09-04
Payer: MEDICAID

## 2020-09-04 VITALS
BODY MASS INDEX: 27.47 KG/M2 | SYSTOLIC BLOOD PRESSURE: 120 MMHG | WEIGHT: 164.88 LBS | DIASTOLIC BLOOD PRESSURE: 74 MMHG | HEIGHT: 65 IN

## 2020-09-04 DIAGNOSIS — N92.6 IRREGULAR MENSES: Primary | ICD-10-CM

## 2020-09-04 PROCEDURE — 99999 PR PBB SHADOW E&M-EST. PATIENT-LVL III: ICD-10-PCS | Mod: PBBFAC,,, | Performed by: NURSE PRACTITIONER

## 2020-09-04 PROCEDURE — 99213 PR OFFICE/OUTPT VISIT, EST, LEVL III, 20-29 MIN: ICD-10-PCS | Mod: S$PBB,,, | Performed by: NURSE PRACTITIONER

## 2020-09-04 PROCEDURE — 99213 OFFICE O/P EST LOW 20 MIN: CPT | Mod: S$PBB,,, | Performed by: NURSE PRACTITIONER

## 2020-09-04 PROCEDURE — 99213 OFFICE O/P EST LOW 20 MIN: CPT | Mod: PBBFAC | Performed by: NURSE PRACTITIONER

## 2020-09-04 PROCEDURE — 99999 PR PBB SHADOW E&M-EST. PATIENT-LVL III: CPT | Mod: PBBFAC,,, | Performed by: NURSE PRACTITIONER

## 2020-09-04 RX ORDER — AMLODIPINE BESYLATE 5 MG/1
5 TABLET ORAL DAILY
COMMUNITY
Start: 2020-08-15 | End: 2021-03-05

## 2020-09-04 RX ORDER — LEVONORGESTREL AND ETHINYL ESTRADIOL 0.1-0.02MG
1 KIT ORAL DAILY
Qty: 28 TABLET | Refills: 12 | Status: SHIPPED | OUTPATIENT
Start: 2020-09-04 | End: 2021-03-05

## 2020-09-04 NOTE — PATIENT INSTRUCTIONS
Understanding Periods  Having a period is a normal, healthy part of becoming and being a woman. A period is the result of a cycle that takes place inside a girls body. This menstrual cycle makes it possible for women to have babies. The cycle begins with the first day of bleeding. In the middle of the cycle, ovulation occurs. This is when an egg is released and begins its journey from the ovary to the uterus.    An egg is released  · During each cycle, 1 egg grows and is released from an ovary. It finds its way to the fallopian tube.  The egg travels through a tube  · The egg moves through the fallopian tube toward the uterus. (If the egg and a mans sperm meet here, a woman becomes pregnant.)  The lining thickens  · The lining of the uterus grows thicker. This lining is made up of blood, tissue, and fluid. (The lining will nourish a growing baby during pregnancy.)  The egg and lining are shed  · About once a month, the egg and the lining of the uterus are shed through the vagina. This is called a period. (A period does not happen during pregnancy.)  Date Last Reviewed: 5/9/2015  © 0342-6779 [employer]. [address] [place] [address]. All rights reserved. This information is not intended as a substitute for professional medical care. Always follow your healthcare professional's instructions.        Hormones and Your Menstrual Cycle  A woman's menstrual cycle (monthly period) is controlled by changing levels of certain hormones. These hormones travel through the blood. Two hormones, estrogen and progesterone, play a big role in the menstrual cycle. They are produced in the ovaries (where eggs are stored).    The menstrual cycle  Hormones help prepare the uterus for pregnancy. At the start of the cycle, the 2 ovaries produce estrogen. This makes 1 ovary release an egg, and signals the production of progesterone. The egg travels through the fallopian tube. Then it enters the uterus. If the  egg is fertilized, a woman becomes pregnant. If this doesn't happen, the egg is shed along with the uterine lining. This bleeding is called menstruation.  Symptoms you may have  Days 1 to 7  · Menstrual bleeding  · Cramping  · Headache  Days 8 to 14  · Increased and thickened vaginal mucus  · Higher energy  Days 15 to 28  · Bloating  · Tiredness  · Cramping  · Irritability  · Breast tenderness   Date Last Reviewed: 5/13/2015  © 9339-0630 [employer]. [address] [place] [address]. All rights reserved. This information is not intended as a substitute for professional medical care. Always follow your healthcare professional's instructions.        Understanding the Normal Menstrual Cycle  Having a period (menstruation) is a normal, healthy part of being a woman. Its also part of the menstrual cycle, a process that makes it possible for women to become pregnant. The first day of your period is the first day of your menstrual cycle.   A woman who has irregular cycles can become pregnant during bleeding. This may not be a true menstrual period.   An egg is released    Eggs are female reproductive cells stored in the ovaries. During each cycle, a woman's hormones trigger an egg, (usually 1), to mature and be released from an ovary. This is called ovulation. The egg then travels from the ovary to a fallopian tube.  The egg travels through a tube  The egg moves through the fallopian tube toward the uterus. If sperm are present in the tube, the egg may be fertilized, and pregnancy could result.  The uterine lining grows thicker  The lining of the uterus is made up of blood, tissue, and fluid. During each cycle, hormones cause the lining to thicken. This helps prepare the uterus to receive and nourish a fertilized egg.   The egg and lining are shed  If pregnancy doesnt happen, the egg and thickened lining of the uterus are no longer needed. They are then shed through the vagina. This is called a  menstrual period.  How long is each cycle?  It is normal for a cycle to take 21 to 34 days. For teenagers, the time between periods might be as much as 45 days. For adults, it will be around a month from the first day of one period to the first day of the next. Thats why you may hear women talk about a monthly cycle, even though cycle length can vary from one month to another, and anywhere from 3 to 5 weeks is normal. Not everyone has a 28-day cycle.    How long does a period last?  Its normal for a period to last 2 to 7 days. Talk to your healthcare provider if your period lasts longer than 7 days for 2 cycles in a row.  Date Last Reviewed: 12/1/2016  © 6139-9282 [employer]. [address] [place] [address]. All rights reserved. This information is not intended as a substitute for professional medical care. Always follow your healthcare professional's instructions.        Painful Menstrual Periods (Dysmenorrhea)    Dysmenorrhea is the term used to describe painful menstrual periods.  The uterus is a muscle. Normally, chemicals called prostaglandins cause the uterus to contract during your period. The contractions push out the build-up of tissue that occurs each month inside the uterus. If the contraction is very strong, it can cause pain. The pain may feel like cramping in the lower abdomen, lower back, or thighs. In severe cases, you may have other symptoms as well. These can include nausea, vomiting, loose stools, sweating, or dizziness.  There are 2 types of dysmenorrhea:  Primary dysmenorrhea refers to common menstrual cramps. It may begin 1 or 2 years after you first get your period. It may get better or go away as you get older or when you have a baby. The cramps are most often felt just before, or on the day of your period. They may last 1 to 3 days. Treatment is with medicines and comfort measures as described below (see the Home care section).  Secondary dysmenorrhea may start  later in life. It describes menstrual pain that occurs due to underlying health problem. The pain may last longer than common menstrual cramps. It may also worsen over time. Some problems that can lead to secondary dysmenorrhea include:   · Pelvic inflammatory disease (PID): Infection that involves the female reproductive organs, such as the uterus and fallopian tubes  · Fibroids: Benign growths within the wall of the uterus (not cancer)  · Endometriosis: Tissue that normally only lines the uterus also grows outside of it (because the abnormal tissue also swells and bleeds each month, it can cause pain)  Once the cause of secondary dysmenorrhea is found, it can be treated. Your healthcare provider will discuss options with you as needed. Your care may also include some of the treatments described below (see the Home care section).  Home care  Medicines  Certain medicines can be used to help relieve or prevent menstrual pain and cramping. These can include:  · Nonsteroidal anti-inflammatory drugs (NSAIDs), such as ibuprofen  · Prescription pain medicine, if needed  · Hormone therapy (this includes most methods of hormonal birth control such as pills, shots, or a hormone-releasing IUD)  General care  To help relieve pain and cramping, try these tips:  · Rest as needed.  · Apply a heating pad to the lower belly or back as directed. A warm bath or massage to these areas may also help.  · Exercise regularly. Many women find that being more active each week helps reduce pain and cramping.  · Ask your healthcare provider for advice about other treatments you can try to help control pain and cramping.  Follow-up care  Follow up with your healthcare provider as advised.  When to seek medical advice  Call your healthcare provider right away if any of these occur:  · Fever of 100.4°F (38°C) or higher, or as directed by your provider  · Pain or cramping worsens or doesnt improve with medicine  · Pain or cramping lasts longer  than usual or occurs between periods  · Unusual vaginal discharge between periods  · Bleeding becomes heavy (soaking more than 1 pad or tampon every hour for 3 hours)  · Passage of pink or gray tissue from the vagina  Date Last Reviewed: 6/11/2015 © 2000-2017 [employer] [address] [place] [address]. All rights reserved. This information is not intended as a substitute for professional medical care. Always follow your healthcare professional's instructions.        Polycystic Ovary Syndrome  Polycystic ovary syndrome (PCOS) causes harmless, small cysts in the ovaries and other symptoms. PCOS is caused by certain hormones being out of balance. The word syndrome means a group of symptoms. Women with PCOS may have no periods, irregular periods, or very long periods.    Your ovaries  Women store their eggs in their ovaries. Each egg is in a capsule called a follicle. Normally during the reproductive years, one follicle grows to produce a mature egg each month. This egg is released during ovulation and the follicle dissolves.  Hormones out of balance  With polycystic ovary syndrome (PCOS), the hormones that control ovulation are out of balance. These include estrogen, progesterones, and androgen. As a result, ovulation may not occur. Instead, the follicle stays enlarged. This is a fluid-filled sac (cyst). Over time, the ovaries fill with many small cysts. This is why they are called poly (many) cystic ovaries. In some women, the ovaries also make too much androgen.  PCOS symptoms  Women with PCOS may also have one or more of these symptoms:  · Trouble getting pregnant (fertility problems)  · Weight gain, especially around the waist   · Acne  · Hair growth on the face and other parts of the body  · Patches of thickened, velvety, darkened skin called acanthosis nigricans  Women with PCOS are also at increased risk of developing cancer of the uterine lining, diabetes, and heart disease.    Date Last Reviewed: 5/10/2015  © 7064-8378 [employer]. [address] [place] [address], [place] [address]. All rights reserved. This information is not intended as a substitute for professional medical care. Always follow your healthcare professional's instructions.

## 2020-09-04 NOTE — PROGRESS NOTES
Yumiko Bautista is a 29 y.o. female  presents for questions about PCOS and still with some irregularity starting the ocp she is currently on.  Just completing 3rd week of pack #2.    LMP: Patient's last menstrual period was 2020..     Past Medical History:   Diagnosis Date    Hypertension     Mental disorder     Bi polar     Past Surgical History:   Procedure Laterality Date    cyst removal       Social History     Socioeconomic History    Marital status: Single     Spouse name: Not on file    Number of children: Not on file    Years of education: Not on file    Highest education level: Not on file   Occupational History    Not on file   Social Needs    Financial resource strain: Not on file    Food insecurity     Worry: Not on file     Inability: Not on file    Transportation needs     Medical: Not on file     Non-medical: Not on file   Tobacco Use    Smoking status: Never Smoker    Smokeless tobacco: Never Used   Substance and Sexual Activity    Alcohol use: No    Drug use: No    Sexual activity: Not Currently   Lifestyle    Physical activity     Days per week: Not on file     Minutes per session: Not on file    Stress: Not on file   Relationships    Social connections     Talks on phone: Not on file     Gets together: Not on file     Attends Rastafari service: Not on file     Active member of club or organization: Not on file     Attends meetings of clubs or organizations: Not on file     Relationship status: Not on file   Other Topics Concern    Not on file   Social History Narrative    Not on file     Family History   Problem Relation Age of Onset    Hypertension Mother     Hypertension Paternal Grandfather     Ovarian cancer Paternal Grandmother     Hypertension Paternal Grandmother     Hypertension Maternal Grandmother     Hypertension Maternal Grandfather      OB History        0    Para   0    Term   0       0    AB   0    Living   0       SAB   0     "TAB   0    Ectopic   0    Multiple   0    Live Births   0                 /74   Ht 5' 5" (1.651 m)   Wt 74.8 kg (164 lb 14.5 oz)   LMP 08/23/2020   BMI 27.44 kg/m²       ROS:  Per hpi    PHYSICAL EXAM:  APPEARANCE: Well nourished, well developed, in no acute distress.  AFFECT: WNL, alert and oriented x 3  deferred  Physical Exam    1. Irregular menses      AND PLAN:    Continue current pill as has only been on for 2 mths- going into her inactive week will have her start a new pack and continue active pills  Will try for next 2 mths and then message me how cycle is doing  If still bleeding irregular may go to [email or web address]                   "

## 2020-09-09 ENCOUNTER — CLINICAL SUPPORT (OUTPATIENT)
Dept: REHABILITATION | Facility: HOSPITAL | Age: 30
End: 2020-09-09
Payer: MEDICAID

## 2020-09-09 DIAGNOSIS — R53.1 WEAKNESS: ICD-10-CM

## 2020-09-09 DIAGNOSIS — R10.2 PELVIC PAIN IN FEMALE: Primary | ICD-10-CM

## 2020-09-09 PROCEDURE — 97140 MANUAL THERAPY 1/> REGIONS: CPT | Performed by: PHYSICAL THERAPIST

## 2020-09-09 PROCEDURE — 97110 THERAPEUTIC EXERCISES: CPT | Performed by: PHYSICAL THERAPIST

## 2020-09-09 NOTE — PROGRESS NOTES
Outpatient Therapy Updated Plan of Care      Visit Date: 6/30/2020  Name: Yumiko Bautista  Clinic Number: [id number]     Therapy Diagnosis:        Encounter Diagnoses   Name Primary?    Pelvic pain in female Yes    Weakness       Physician: Darlene Oliveros*     Physician Orders: vaginismus  Medical Diagnosis: vaginismus  Evaluation Date: 3/8/19     Total Visits Receive: 25  Cancelled Visits: 2  No Show Visits: 0     Current Certification Period:  1/10/20 to 12/31/20  Precautions:  none  Visits from Evaluation Date:  5/20  Functional Level Prior to Evaluation:  Constipation, frequent urination and pain with vaginal penetration     Subjective      Update: I got a dx of low Vit D and I'm hoping it will help with the muscle soreness nad fatigue, my depression and menstrual cycles. I have constipation right now       Objective      Update: Pt was on menstrual cycle and unable to participate in any internal vaginal exam.      Self care: 15 min: deep breathing for PF mm relaxation, continue Bowel Program and management of  stress    Manual: 10 minutes of bowel massage    Total tx time: 25 min  Treatment time in and out: 1145-1215pm  Assessment      Update: Pt progress very slow due to non compliance with HEP and therapy due to personal reasons and long menstrual cycle    Plan      Other Recommendations: Pt may benefit from gastro consult     Reema Wade, BRADY Wade, PT, DPT, MTC, OCS  Board certified Orthopaedic Specialist  Pelvic   Department of Physical Therapy  [phone]          I CERTIFY THE NEED FOR THESE SERVICES FURNISHED UNDER THIS PLAN OF TREATMENT AND WHILE UNDER MY CARE     Physician's comments:

## 2020-11-19 ENCOUNTER — CLINICAL SUPPORT (OUTPATIENT)
Dept: REHABILITATION | Facility: HOSPITAL | Age: 30
End: 2020-11-19
Payer: MEDICAID

## 2020-11-19 DIAGNOSIS — R10.2 PELVIC PAIN IN FEMALE: Primary | ICD-10-CM

## 2020-11-19 PROCEDURE — 97140 MANUAL THERAPY 1/> REGIONS: CPT | Mod: PN | Performed by: PHYSICAL THERAPIST

## 2020-11-19 NOTE — PLAN OF CARE
Outpatient Therapy Updated Plan of Care     Visit Date: 11/19/2020  Name: Yumiko Bautista  Clinic Number: [id number]    Therapy Diagnosis: No diagnosis found.  Physician: Darlene Oliveros*; Ana Jones, SHANNAN    Physician Orders: PT eval and treat   Medical Diagnosis: vaginismus  Evaluation Date: 1/10/20    Total Visits Received: 8  Cancelled Visits: 4  No Show Visits: 0    Current Certification Period:  1/2/20 to 12/31/20  Precautions:    Visits from Evaluation Date:  8  Functional Level Prior to Evaluation:  Pain with woman's wellness exams    Subjective     Update: I'm having more frequent BM- 1-2x weekly instead of 1-2x monthly. I have not had intercourse due to irregular menses and cyst in labia. I have not been doing my dilators but I do some stretches given. I need a well Woman exam but I am frightened due to the pain     Objective     Update: burning pain and feelings of urine urgency with palpation to perineal body at introitus that resolved after manual pressure for 1-2 minutes. Pt needs VC to cease gluteal contractions while examination and to perform deep breathing.  Only bale to palpation superficial layer of PF mm today due to pain    Treatment: manual 15 min: superficial PF mm  self care instructions: reduce constipation with natural solutions and bowel retraining; use of dilators 45 min    Assessment     Update: Pt demo high PF mm resting tone, high anxiety with exam. PT educated on self manual mm release at home and reducing constipation     Previous Short Term Goals Status:   Improved constipation since last visit  New Short Term Goals Status:  Pt report BM 4-5x weekly, and manual massage to perineum and deep breathing daily   Long Term Goal Status:   continue per initial plan of care: attend woman wellness exam for annual pap smear and intercouse without pain  Reasons for Recertification of Therapy: Pt progress slow but due to other health conditions such as irregular menses    Plan     Updated  Certification Period: 11/19/2020 to 12/31/20  Recommended Treatment Plan: 1 times per week for 12 weeks: Manual Therapy, Neuromuscular Re-ed, Patient Education, Self Care, Therapeutic Activites and Therapeutic Exercise  Other Recommendations: HEP with dilators    Reema Wade, PT  11/19/2020      I CERTIFY THE NEED FOR THESE SERVICES FURNISHED UNDER THIS PLAN OF TREATMENT AND WHILE UNDER MY CARE    Physician's comments:        Physician's Signature: ___________________________________________________

## 2020-11-24 ENCOUNTER — CLINICAL SUPPORT (OUTPATIENT)
Dept: REHABILITATION | Facility: HOSPITAL | Age: 30
End: 2020-11-24
Payer: MEDICAID

## 2020-11-24 DIAGNOSIS — R10.2 PELVIC PAIN IN FEMALE: Primary | ICD-10-CM

## 2020-11-24 PROCEDURE — 97140 MANUAL THERAPY 1/> REGIONS: CPT | Mod: PN | Performed by: PHYSICAL THERAPIST

## 2020-11-24 NOTE — PROGRESS NOTES
Pelvic Health Physical Therapy   Treatment Note     Name: Yumiko Bautista  Clinic Number: [id number]    Therapy Diagnosis:   Encounter Diagnosis   Name Primary?    Pelvic pain in female Yes     Physician: Darlene Oliveros*    Visit Date: 11/24/2020    Physician Orders: Pt to eval nad tx  Medical Diagnosis: vaginismus  Evaluation Date: 1/3/20  Authorization Period Expiration: 12/31/20  Plan of Care Certification Period: 12/31/20  Visit #/Visits authorized: 1/20   Cancelled Visits: 1  No Show Visits: 0    Time In: 345  Time Out: 430  Total Billable Time: 45 minutes    Precautions: Standard    Subjective     Pt reports:I have been working on relaxing my PF mm instead of pushing during urination. I have not been doing my perineal massage at home  She was compliant with home exercise program.  Response to previous treatment: good  Functional change: more normal voiding    Pain: 0/10  Location: 0    Objective       Yumiko received the following manual therapy techniques: to develop desensitization for 15 minutes including: soft tissue mobilization of superficail mm of PF      Yumiko participated in neuromuscular re-education activities to develop Coordination, Control and Down training for 15 minutes including: pelvic floor relaxation/bulging training and diaphragmatic breathing          Home Exercises Provided and Patient Education Provided 15 min    Education provided:   - bladder irritants, anatomy/physiology of pelvic floor, vulvar irritants, bladder retraining, dilator use and diaphragmatic breathing  Discussed progression of plan of care with patient; educated pt in activity modification; reviewed HEP with pt. Pt demonstrated and verbalized understanding of all instruction and was provided with a handout of HEP (see Patient Instructions).  -     Written Home Exercises Provided: Patient instructed to cont prior HEP.  Exercises were reviewed and Yumiko was able to demonstrate them prior to the end of the session.   Yumiko demonstrated good  understanding of the education provided.     See EMR under Patient Instructions for exercises provided prior visit.    Assessment     Pt reported 8/10 pain in superficial mm that reduced to 2/10 after relaxation exercises and manual today  Yumiko is progressing well towards her goals.   Pt prognosis is Good.     Pt will continue to benefit from skilled outpatient physical therapy to address the deficits listed in the problem list box on initial evaluation, provide pt/family education and to maximize pt's level of independence in the home and community environment.     Pt's spiritual, cultural and educational needs considered and pt agreeable to plan of care and goals.     Anticipated barriers to physical therapy: none    Goals: Pt report ability to use tampon during cycle with minimal to no pain. Pt report minimal pain with well woman's exam    Plan     Cont POC    Reema Wade, PT

## 2020-12-14 ENCOUNTER — CLINICAL SUPPORT (OUTPATIENT)
Dept: REHABILITATION | Facility: HOSPITAL | Age: 30
End: 2020-12-14
Payer: MEDICAID

## 2020-12-14 DIAGNOSIS — R10.2 PELVIC PAIN IN FEMALE: Primary | ICD-10-CM

## 2020-12-14 PROCEDURE — 97140 MANUAL THERAPY 1/> REGIONS: CPT | Mod: PN | Performed by: PHYSICAL THERAPIST

## 2020-12-14 NOTE — PROGRESS NOTES
Pelvic Health Physical Therapy   Treatment Note     Name: Yumiko Bautista  Clinic Number: [id number]    Therapy Diagnosis:   No diagnosis found.  Physician: Darlene Oliveros*    Visit Date: 12/14/2020    Physician Orders: Pt to eval nad tx  Medical Diagnosis: vaginismus  Evaluation Date: 1/3/20  Authorization Period Expiration: 12/31/20  Plan of Care Certification Period: 12/31/20  Visit #/Visits authorized: 2/20   Cancelled Visits: 1  No Show Visits: 0    Time In: 230  Time Out: 330  Total Billable Time: 60 minutes    Precautions: Standard    Subjective     Pt reports:I have been working on relaxing my PF mm instead of pushing during urination. I have not been doing my perineal massage at home. I have been having a lot of bloating that maybe from dairy?   She was compliant with home exercise program.  Response to previous treatment: good  Functional change: more normal voiding    Pain: 0/10  Location: 0    Objective       Yumiko received the following manual therapy techniques: to develop desensitization for 15 minutes including: soft tissue mobilization of superficail mm of PF      Yumiko participated in neuromuscular re-education activities to develop Coordination, Control and Down training for 25 minutes including: pelvic floor relaxation/bulging training and diaphragmatic breathing with external SEMG          Home Exercises Provided and Patient Education Provided 15 min    Education provided:   - bladder irritants, anatomy/physiology of pelvic floor, vulvar irritants, bladder retraining, dilator use and diaphragmatic breathing  Discussed progression of plan of care with patient; educated pt in activity modification; reviewed HEP with pt. Pt demonstrated and verbalized understanding of all instruction and was provided with a handout of HEP (see Patient Instructions).  -     Written Home Exercises Provided: Patient instructed to cont prior HEP.  Exercises were reviewed and Yumiko was able to demonstrate them  prior to the end of the session.  Yumiko demonstrated good  understanding of the education provided.     See EMR under Patient Instructions for exercises provided prior visit.    Assessment     Pt reported 4-7/10 pain in superficial mm that reduced to 2/10 after relaxation exercises and manual today  Yumiko is progressing well towards her goals. childpose reduced PF mm activity on SEMG from 6-8 to 2-4MV  Pt prognosis is Good.     Pt will continue to benefit from skilled outpatient physical therapy to address the deficits listed in the problem list box on initial evaluation, provide pt/family education and to maximize pt's level of independence in the home and community environment.     Pt's spiritual, cultural and educational needs considered and pt agreeable to plan of care and goals.     Anticipated barriers to physical therapy: none    Goals: Pt report ability to use tampon during cycle with minimal to no pain. Pt report minimal pain with well woman's exam    Plan     Cont POC    Reema Wade, PT

## 2020-12-28 ENCOUNTER — PATIENT MESSAGE (OUTPATIENT)
Dept: REHABILITATION | Facility: HOSPITAL | Age: 30
End: 2020-12-28

## 2021-01-04 ENCOUNTER — CLINICAL SUPPORT (OUTPATIENT)
Dept: REHABILITATION | Facility: HOSPITAL | Age: 31
End: 2021-01-04
Payer: MEDICAID

## 2021-01-04 DIAGNOSIS — R10.2 PELVIC PAIN IN FEMALE: Primary | ICD-10-CM

## 2021-01-04 PROCEDURE — 97112 NEUROMUSCULAR REEDUCATION: CPT | Mod: PN | Performed by: PHYSICAL THERAPIST

## 2021-01-11 ENCOUNTER — CLINICAL SUPPORT (OUTPATIENT)
Dept: REHABILITATION | Facility: HOSPITAL | Age: 31
End: 2021-01-11
Payer: MEDICAID

## 2021-01-11 DIAGNOSIS — R53.1 WEAKNESS: Primary | ICD-10-CM

## 2021-01-11 DIAGNOSIS — R10.2 PELVIC PAIN IN FEMALE: ICD-10-CM

## 2021-01-11 PROCEDURE — 97140 MANUAL THERAPY 1/> REGIONS: CPT | Mod: PN | Performed by: PHYSICAL THERAPIST

## 2021-01-19 ENCOUNTER — CLINICAL SUPPORT (OUTPATIENT)
Dept: REHABILITATION | Facility: HOSPITAL | Age: 31
End: 2021-01-19
Payer: MEDICAID

## 2021-01-19 DIAGNOSIS — R53.1 WEAKNESS: Primary | ICD-10-CM

## 2021-01-19 DIAGNOSIS — R10.2 PELVIC PAIN IN FEMALE: ICD-10-CM

## 2021-01-19 PROCEDURE — 97112 NEUROMUSCULAR REEDUCATION: CPT | Mod: PN | Performed by: PHYSICAL THERAPIST

## 2021-01-19 PROCEDURE — 97140 MANUAL THERAPY 1/> REGIONS: CPT | Mod: PN | Performed by: PHYSICAL THERAPIST

## 2021-01-21 ENCOUNTER — PATIENT MESSAGE (OUTPATIENT)
Dept: REHABILITATION | Facility: HOSPITAL | Age: 31
End: 2021-01-21

## 2021-02-02 ENCOUNTER — CLINICAL SUPPORT (OUTPATIENT)
Dept: REHABILITATION | Facility: HOSPITAL | Age: 31
End: 2021-02-02
Payer: MEDICAID

## 2021-02-02 PROCEDURE — 97112 NEUROMUSCULAR REEDUCATION: CPT | Mod: PN | Performed by: PHYSICAL THERAPIST

## 2021-02-09 ENCOUNTER — CLINICAL SUPPORT (OUTPATIENT)
Dept: REHABILITATION | Facility: HOSPITAL | Age: 31
End: 2021-02-09
Payer: MEDICAID

## 2021-02-09 DIAGNOSIS — R53.1 WEAKNESS: ICD-10-CM

## 2021-02-09 DIAGNOSIS — R10.2 PELVIC PAIN IN FEMALE: Primary | ICD-10-CM

## 2021-02-09 PROCEDURE — 97530 THERAPEUTIC ACTIVITIES: CPT | Mod: PN | Performed by: PHYSICAL THERAPIST

## 2021-02-10 ENCOUNTER — PATIENT MESSAGE (OUTPATIENT)
Dept: REHABILITATION | Facility: HOSPITAL | Age: 31
End: 2021-02-10

## 2021-02-12 ENCOUNTER — PATIENT MESSAGE (OUTPATIENT)
Dept: OBSTETRICS AND GYNECOLOGY | Facility: CLINIC | Age: 31
End: 2021-02-12

## 2021-02-17 ENCOUNTER — PATIENT MESSAGE (OUTPATIENT)
Dept: REHABILITATION | Facility: HOSPITAL | Age: 31
End: 2021-02-17

## 2021-02-22 ENCOUNTER — PATIENT MESSAGE (OUTPATIENT)
Dept: OBSTETRICS AND GYNECOLOGY | Facility: CLINIC | Age: 31
End: 2021-02-22

## 2021-03-04 ENCOUNTER — PATIENT MESSAGE (OUTPATIENT)
Dept: OBSTETRICS AND GYNECOLOGY | Facility: CLINIC | Age: 31
End: 2021-03-04

## 2021-03-05 ENCOUNTER — OFFICE VISIT (OUTPATIENT)
Dept: OBSTETRICS AND GYNECOLOGY | Facility: CLINIC | Age: 31
End: 2021-03-05
Payer: MEDICAID

## 2021-03-05 ENCOUNTER — LAB VISIT (OUTPATIENT)
Dept: LAB | Facility: HOSPITAL | Age: 31
End: 2021-03-05
Attending: NURSE PRACTITIONER
Payer: MEDICAID

## 2021-03-05 VITALS
BODY MASS INDEX: 28.66 KG/M2 | HEIGHT: 65 IN | DIASTOLIC BLOOD PRESSURE: 82 MMHG | SYSTOLIC BLOOD PRESSURE: 128 MMHG | WEIGHT: 172 LBS

## 2021-03-05 DIAGNOSIS — N93.9 ABNORMAL UTERINE BLEEDING (AUB): Primary | ICD-10-CM

## 2021-03-05 DIAGNOSIS — N93.9 ABNORMAL UTERINE BLEEDING (AUB): ICD-10-CM

## 2021-03-05 DIAGNOSIS — Z11.3 SCREENING EXAMINATION FOR STD (SEXUALLY TRANSMITTED DISEASE): ICD-10-CM

## 2021-03-05 LAB
B-HCG UR QL: NEGATIVE
BASOPHILS # BLD AUTO: 0.02 K/UL (ref 0–0.2)
BASOPHILS NFR BLD: 0.6 % (ref 0–1.9)
CTP QC/QA: YES
DIFFERENTIAL METHOD: ABNORMAL
EOSINOPHIL # BLD AUTO: 0 K/UL (ref 0–0.5)
EOSINOPHIL NFR BLD: 1.2 % (ref 0–8)
ERYTHROCYTE [DISTWIDTH] IN BLOOD BY AUTOMATED COUNT: 13.3 % (ref 11.5–14.5)
HCT VFR BLD AUTO: 40.1 % (ref 37–48.5)
HGB BLD-MCNC: 13 G/DL (ref 12–16)
IMM GRANULOCYTES # BLD AUTO: 0.01 K/UL (ref 0–0.04)
IMM GRANULOCYTES NFR BLD AUTO: 0.3 % (ref 0–0.5)
LYMPHOCYTES # BLD AUTO: 1.2 K/UL (ref 1–4.8)
LYMPHOCYTES NFR BLD: 34.8 % (ref 18–48)
MCH RBC QN AUTO: 27.7 PG (ref 27–31)
MCHC RBC AUTO-ENTMCNC: 32.4 G/DL (ref 32–36)
MCV RBC AUTO: 86 FL (ref 82–98)
MONOCYTES # BLD AUTO: 0.3 K/UL (ref 0.3–1)
MONOCYTES NFR BLD: 8.5 % (ref 4–15)
NEUTROPHILS # BLD AUTO: 1.9 K/UL (ref 1.8–7.7)
NEUTROPHILS NFR BLD: 54.6 % (ref 38–73)
NRBC BLD-RTO: 0 /100 WBC
PLATELET # BLD AUTO: 233 K/UL (ref 150–350)
PMV BLD AUTO: 10.8 FL (ref 9.2–12.9)
RBC # BLD AUTO: 4.69 M/UL (ref 4–5.4)
WBC # BLD AUTO: 3.42 K/UL (ref 3.9–12.7)

## 2021-03-05 PROCEDURE — 99212 OFFICE O/P EST SF 10 MIN: CPT | Mod: S$PBB,,, | Performed by: NURSE PRACTITIONER

## 2021-03-05 PROCEDURE — 99999 PR PBB SHADOW E&M-EST. PATIENT-LVL III: ICD-10-PCS | Mod: PBBFAC,,, | Performed by: NURSE PRACTITIONER

## 2021-03-05 PROCEDURE — 36415 COLL VENOUS BLD VENIPUNCTURE: CPT | Performed by: NURSE PRACTITIONER

## 2021-03-05 PROCEDURE — 81025 URINE PREGNANCY TEST: CPT | Mod: PBBFAC | Performed by: NURSE PRACTITIONER

## 2021-03-05 PROCEDURE — 99212 PR OFFICE/OUTPT VISIT, EST, LEVL II, 10-19 MIN: ICD-10-PCS | Mod: S$PBB,,, | Performed by: NURSE PRACTITIONER

## 2021-03-05 PROCEDURE — 99213 OFFICE O/P EST LOW 20 MIN: CPT | Mod: PBBFAC | Performed by: NURSE PRACTITIONER

## 2021-03-05 PROCEDURE — 87491 CHLMYD TRACH DNA AMP PROBE: CPT | Performed by: NURSE PRACTITIONER

## 2021-03-05 PROCEDURE — 99999 PR PBB SHADOW E&M-EST. PATIENT-LVL III: CPT | Mod: PBBFAC,,, | Performed by: NURSE PRACTITIONER

## 2021-03-05 PROCEDURE — 86592 SYPHILIS TEST NON-TREP QUAL: CPT | Performed by: NURSE PRACTITIONER

## 2021-03-05 PROCEDURE — 87591 N.GONORRHOEAE DNA AMP PROB: CPT | Performed by: NURSE PRACTITIONER

## 2021-03-05 PROCEDURE — 85025 COMPLETE CBC W/AUTO DIFF WBC: CPT | Performed by: NURSE PRACTITIONER

## 2021-03-05 PROCEDURE — 84443 ASSAY THYROID STIM HORMONE: CPT | Performed by: NURSE PRACTITIONER

## 2021-03-05 PROCEDURE — 86703 HIV-1/HIV-2 1 RESULT ANTBDY: CPT | Performed by: NURSE PRACTITIONER

## 2021-03-05 RX ORDER — NORGESTIMATE AND ETHINYL ESTRADIOL 0.25-0.035
1 KIT ORAL DAILY
Qty: 28 TABLET | Refills: 11 | Status: SHIPPED | OUTPATIENT
Start: 2021-03-05 | End: 2021-10-21 | Stop reason: SINTOL

## 2021-03-05 RX ORDER — TRAZODONE HYDROCHLORIDE 50 MG/1
TABLET ORAL
COMMUNITY
Start: 2021-02-05 | End: 2021-07-28

## 2021-03-05 RX ORDER — HYDROCHLOROTHIAZIDE 12.5 MG/1
CAPSULE ORAL
COMMUNITY
Start: 2021-02-03 | End: 2021-04-12

## 2021-03-05 RX ORDER — LAMOTRIGINE 200 MG/1
200 TABLET ORAL DAILY
COMMUNITY
Start: 2021-02-08

## 2021-03-06 ENCOUNTER — TELEPHONE (OUTPATIENT)
Dept: OBSTETRICS AND GYNECOLOGY | Facility: CLINIC | Age: 31
End: 2021-03-06

## 2021-03-06 LAB
RPR SER QL: NORMAL
TSH SERPL DL<=0.005 MIU/L-ACNC: 1.06 UIU/ML (ref 0.4–4)

## 2021-03-08 ENCOUNTER — PATIENT MESSAGE (OUTPATIENT)
Dept: OBSTETRICS AND GYNECOLOGY | Facility: CLINIC | Age: 31
End: 2021-03-08

## 2021-03-08 LAB
C TRACH DNA SPEC QL NAA+PROBE: NOT DETECTED
HIV 1+2 AB+HIV1 P24 AG SERPL QL IA: NEGATIVE
N GONORRHOEA DNA SPEC QL NAA+PROBE: NOT DETECTED

## 2021-03-09 ENCOUNTER — TELEPHONE (OUTPATIENT)
Dept: RADIOLOGY | Facility: HOSPITAL | Age: 31
End: 2021-03-09

## 2021-03-09 ENCOUNTER — PATIENT MESSAGE (OUTPATIENT)
Dept: OBSTETRICS AND GYNECOLOGY | Facility: CLINIC | Age: 31
End: 2021-03-09

## 2021-03-10 ENCOUNTER — HOSPITAL ENCOUNTER (OUTPATIENT)
Dept: RADIOLOGY | Facility: HOSPITAL | Age: 31
Discharge: HOME | End: 2021-03-10
Attending: NURSE PRACTITIONER
Payer: MEDICAID

## 2021-03-10 DIAGNOSIS — N93.9 ABNORMAL UTERINE BLEEDING (AUB): ICD-10-CM

## 2021-03-10 PROCEDURE — 76856 US EXAM PELVIC COMPLETE: CPT | Mod: TC

## 2021-03-10 PROCEDURE — 76856 US EXAM PELVIC COMPLETE: CPT | Mod: 26,,, | Performed by: RADIOLOGY

## 2021-03-10 PROCEDURE — 76856 US PELVIS COMPLETE NON OB: ICD-10-PCS | Mod: 26,,, | Performed by: RADIOLOGY

## 2021-03-15 ENCOUNTER — PATIENT MESSAGE (OUTPATIENT)
Dept: OBSTETRICS AND GYNECOLOGY | Facility: CLINIC | Age: 31
End: 2021-03-15

## 2021-03-15 ENCOUNTER — TELEPHONE (OUTPATIENT)
Dept: OBSTETRICS AND GYNECOLOGY | Facility: CLINIC | Age: 31
End: 2021-03-15

## 2021-03-25 ENCOUNTER — OFFICE VISIT (OUTPATIENT)
Dept: OBSTETRICS AND GYNECOLOGY | Facility: CLINIC | Age: 31
End: 2021-03-25
Payer: MEDICAID

## 2021-03-25 VITALS
WEIGHT: 171.5 LBS | HEIGHT: 65 IN | DIASTOLIC BLOOD PRESSURE: 84 MMHG | SYSTOLIC BLOOD PRESSURE: 120 MMHG | BODY MASS INDEX: 28.57 KG/M2

## 2021-03-25 DIAGNOSIS — F52.5 FUNCTIONAL VAGINISMUS: Primary | ICD-10-CM

## 2021-03-25 DIAGNOSIS — N93.9 ABNORMAL UTERINE BLEEDING (AUB): ICD-10-CM

## 2021-03-25 PROCEDURE — 99213 OFFICE O/P EST LOW 20 MIN: CPT | Mod: S$PBB,,, | Performed by: NURSE PRACTITIONER

## 2021-03-25 PROCEDURE — 99999 PR PBB SHADOW E&M-EST. PATIENT-LVL III: CPT | Mod: PBBFAC,,, | Performed by: NURSE PRACTITIONER

## 2021-03-25 PROCEDURE — 99999 PR PBB SHADOW E&M-EST. PATIENT-LVL III: ICD-10-PCS | Mod: PBBFAC,,, | Performed by: NURSE PRACTITIONER

## 2021-03-25 PROCEDURE — 99213 OFFICE O/P EST LOW 20 MIN: CPT | Mod: PBBFAC | Performed by: NURSE PRACTITIONER

## 2021-03-25 PROCEDURE — 99213 PR OFFICE/OUTPT VISIT, EST, LEVL III, 20-29 MIN: ICD-10-PCS | Mod: S$PBB,,, | Performed by: NURSE PRACTITIONER

## 2021-04-12 ENCOUNTER — OFFICE VISIT (OUTPATIENT)
Dept: OBSTETRICS AND GYNECOLOGY | Facility: CLINIC | Age: 31
End: 2021-04-12
Payer: MEDICAID

## 2021-04-12 VITALS
BODY MASS INDEX: 27 KG/M2 | HEIGHT: 66 IN | SYSTOLIC BLOOD PRESSURE: 144 MMHG | WEIGHT: 168 LBS | DIASTOLIC BLOOD PRESSURE: 96 MMHG

## 2021-04-12 DIAGNOSIS — N94.2 VAGINISMUS: Primary | ICD-10-CM

## 2021-04-12 DIAGNOSIS — D25.9 UTERINE LEIOMYOMA, UNSPECIFIED LOCATION: ICD-10-CM

## 2021-04-12 DIAGNOSIS — N93.9 ABNORMAL UTERINE BLEEDING (AUB): ICD-10-CM

## 2021-04-12 PROCEDURE — 99999 PR PBB SHADOW E&M-EST. PATIENT-LVL III: ICD-10-PCS | Mod: PBBFAC,,, | Performed by: OBSTETRICS & GYNECOLOGY

## 2021-04-12 PROCEDURE — 99213 OFFICE O/P EST LOW 20 MIN: CPT | Mod: S$PBB,,, | Performed by: OBSTETRICS & GYNECOLOGY

## 2021-04-12 PROCEDURE — 99999 PR PBB SHADOW E&M-EST. PATIENT-LVL III: CPT | Mod: PBBFAC,,, | Performed by: OBSTETRICS & GYNECOLOGY

## 2021-04-12 PROCEDURE — 99213 PR OFFICE/OUTPT VISIT, EST, LEVL III, 20-29 MIN: ICD-10-PCS | Mod: S$PBB,,, | Performed by: OBSTETRICS & GYNECOLOGY

## 2021-04-12 PROCEDURE — 99213 OFFICE O/P EST LOW 20 MIN: CPT | Mod: PBBFAC,PN | Performed by: OBSTETRICS & GYNECOLOGY

## 2021-04-23 ENCOUNTER — PATIENT MESSAGE (OUTPATIENT)
Dept: OBSTETRICS AND GYNECOLOGY | Facility: CLINIC | Age: 31
End: 2021-04-23

## 2021-04-26 ENCOUNTER — CLINICAL SUPPORT (OUTPATIENT)
Dept: REHABILITATION | Facility: HOSPITAL | Age: 31
End: 2021-04-26
Payer: MEDICAID

## 2021-04-26 DIAGNOSIS — R35.0 URINARY FREQUENCY: ICD-10-CM

## 2021-04-26 DIAGNOSIS — F52.5 FUNCTIONAL VAGINISMUS: ICD-10-CM

## 2021-04-26 DIAGNOSIS — K59.00 CONSTIPATION, UNSPECIFIED CONSTIPATION TYPE: ICD-10-CM

## 2021-04-26 DIAGNOSIS — R10.2 PELVIC PAIN: Primary | ICD-10-CM

## 2021-04-26 DIAGNOSIS — R33.9 URINARY RETENTION: ICD-10-CM

## 2021-04-26 PROCEDURE — 97530 THERAPEUTIC ACTIVITIES: CPT

## 2021-04-26 PROCEDURE — 97166 OT EVAL MOD COMPLEX 45 MIN: CPT

## 2021-04-28 ENCOUNTER — PATIENT MESSAGE (OUTPATIENT)
Dept: RESEARCH | Facility: HOSPITAL | Age: 31
End: 2021-04-28

## 2021-05-21 ENCOUNTER — CLINICAL SUPPORT (OUTPATIENT)
Dept: REHABILITATION | Facility: HOSPITAL | Age: 31
End: 2021-05-21
Payer: MEDICAID

## 2021-05-21 DIAGNOSIS — K59.00 CONSTIPATION, UNSPECIFIED CONSTIPATION TYPE: ICD-10-CM

## 2021-05-21 DIAGNOSIS — R33.9 URINARY RETENTION: ICD-10-CM

## 2021-05-21 DIAGNOSIS — R35.0 URINARY FREQUENCY: ICD-10-CM

## 2021-05-21 PROCEDURE — 97530 THERAPEUTIC ACTIVITIES: CPT

## 2021-06-01 ENCOUNTER — CLINICAL SUPPORT (OUTPATIENT)
Dept: REHABILITATION | Facility: HOSPITAL | Age: 31
End: 2021-06-01
Payer: MEDICAID

## 2021-06-01 DIAGNOSIS — R33.9 URINARY RETENTION: ICD-10-CM

## 2021-06-01 DIAGNOSIS — R35.0 URINARY FREQUENCY: ICD-10-CM

## 2021-06-01 DIAGNOSIS — K59.00 CONSTIPATION, UNSPECIFIED CONSTIPATION TYPE: ICD-10-CM

## 2021-06-01 PROCEDURE — 97530 THERAPEUTIC ACTIVITIES: CPT

## 2021-06-09 ENCOUNTER — TELEPHONE (OUTPATIENT)
Dept: REHABILITATION | Facility: HOSPITAL | Age: 31
End: 2021-06-09

## 2021-06-15 ENCOUNTER — CLINICAL SUPPORT (OUTPATIENT)
Dept: REHABILITATION | Facility: HOSPITAL | Age: 31
End: 2021-06-15
Payer: MEDICAID

## 2021-06-15 DIAGNOSIS — R33.9 URINARY RETENTION: ICD-10-CM

## 2021-06-15 DIAGNOSIS — K59.00 CONSTIPATION, UNSPECIFIED CONSTIPATION TYPE: ICD-10-CM

## 2021-06-15 DIAGNOSIS — R35.0 URINARY FREQUENCY: ICD-10-CM

## 2021-06-15 PROCEDURE — 97530 THERAPEUTIC ACTIVITIES: CPT

## 2021-06-22 ENCOUNTER — CLINICAL SUPPORT (OUTPATIENT)
Dept: REHABILITATION | Facility: HOSPITAL | Age: 31
End: 2021-06-22
Payer: MEDICAID

## 2021-06-22 DIAGNOSIS — R35.0 URINARY FREQUENCY: ICD-10-CM

## 2021-06-22 DIAGNOSIS — K59.00 CONSTIPATION, UNSPECIFIED CONSTIPATION TYPE: ICD-10-CM

## 2021-06-22 DIAGNOSIS — R33.9 URINARY RETENTION: ICD-10-CM

## 2021-06-22 PROCEDURE — 97530 THERAPEUTIC ACTIVITIES: CPT

## 2021-06-29 ENCOUNTER — CLINICAL SUPPORT (OUTPATIENT)
Dept: REHABILITATION | Facility: HOSPITAL | Age: 31
End: 2021-06-29
Payer: MEDICAID

## 2021-06-29 DIAGNOSIS — R33.9 URINARY RETENTION: ICD-10-CM

## 2021-06-29 DIAGNOSIS — K59.00 CONSTIPATION, UNSPECIFIED CONSTIPATION TYPE: ICD-10-CM

## 2021-06-29 DIAGNOSIS — R35.0 URINARY FREQUENCY: ICD-10-CM

## 2021-06-29 PROCEDURE — 97530 THERAPEUTIC ACTIVITIES: CPT

## 2021-07-01 ENCOUNTER — TELEPHONE (OUTPATIENT)
Dept: OBSTETRICS AND GYNECOLOGY | Facility: CLINIC | Age: 31
End: 2021-07-01

## 2021-07-01 DIAGNOSIS — K59.00 CONSTIPATION, UNSPECIFIED CONSTIPATION TYPE: Primary | ICD-10-CM

## 2021-07-02 ENCOUNTER — PATIENT MESSAGE (OUTPATIENT)
Dept: HEPATOLOGY | Facility: CLINIC | Age: 31
End: 2021-07-02

## 2021-07-06 ENCOUNTER — CLINICAL SUPPORT (OUTPATIENT)
Dept: REHABILITATION | Facility: HOSPITAL | Age: 31
End: 2021-07-06
Payer: MEDICAID

## 2021-07-06 DIAGNOSIS — K59.00 CONSTIPATION, UNSPECIFIED CONSTIPATION TYPE: ICD-10-CM

## 2021-07-06 DIAGNOSIS — R35.0 URINARY FREQUENCY: ICD-10-CM

## 2021-07-06 DIAGNOSIS — R33.9 URINARY RETENTION: ICD-10-CM

## 2021-07-06 PROCEDURE — 97530 THERAPEUTIC ACTIVITIES: CPT

## 2021-07-08 ENCOUNTER — PATIENT MESSAGE (OUTPATIENT)
Dept: HEPATOLOGY | Facility: CLINIC | Age: 31
End: 2021-07-08

## 2021-07-27 ENCOUNTER — CLINICAL SUPPORT (OUTPATIENT)
Dept: REHABILITATION | Facility: HOSPITAL | Age: 31
End: 2021-07-27
Payer: MEDICAID

## 2021-07-27 DIAGNOSIS — K59.00 CONSTIPATION, UNSPECIFIED CONSTIPATION TYPE: ICD-10-CM

## 2021-07-27 DIAGNOSIS — R33.9 URINARY RETENTION: ICD-10-CM

## 2021-07-27 DIAGNOSIS — R35.0 URINARY FREQUENCY: ICD-10-CM

## 2021-07-27 PROCEDURE — 97530 THERAPEUTIC ACTIVITIES: CPT

## 2021-07-28 ENCOUNTER — OFFICE VISIT (OUTPATIENT)
Dept: GASTROENTEROLOGY | Facility: CLINIC | Age: 31
End: 2021-07-28
Payer: MEDICAID

## 2021-07-28 VITALS
DIASTOLIC BLOOD PRESSURE: 80 MMHG | HEART RATE: 97 BPM | WEIGHT: 169.75 LBS | SYSTOLIC BLOOD PRESSURE: 130 MMHG | HEIGHT: 66 IN | OXYGEN SATURATION: 99 % | BODY MASS INDEX: 27.28 KG/M2

## 2021-07-28 DIAGNOSIS — R14.0 BLOATING: Primary | ICD-10-CM

## 2021-07-28 DIAGNOSIS — K59.00 CONSTIPATION, UNSPECIFIED CONSTIPATION TYPE: ICD-10-CM

## 2021-07-28 PROCEDURE — 99213 OFFICE O/P EST LOW 20 MIN: CPT | Mod: PBBFAC | Performed by: PHYSICIAN ASSISTANT

## 2021-07-28 PROCEDURE — 99203 OFFICE O/P NEW LOW 30 MIN: CPT | Mod: S$PBB,,, | Performed by: PHYSICIAN ASSISTANT

## 2021-07-28 PROCEDURE — 99999 PR PBB SHADOW E&M-EST. PATIENT-LVL III: ICD-10-PCS | Mod: PBBFAC,,, | Performed by: PHYSICIAN ASSISTANT

## 2021-07-28 PROCEDURE — 99203 PR OFFICE/OUTPT VISIT, NEW, LEVL III, 30-44 MIN: ICD-10-PCS | Mod: S$PBB,,, | Performed by: PHYSICIAN ASSISTANT

## 2021-07-28 PROCEDURE — 99999 PR PBB SHADOW E&M-EST. PATIENT-LVL III: CPT | Mod: PBBFAC,,, | Performed by: PHYSICIAN ASSISTANT

## 2021-08-11 DIAGNOSIS — E55.9 VITAMIN D DEFICIENCY: ICD-10-CM

## 2021-08-11 RX ORDER — ERGOCALCIFEROL 1.25 MG/1
50000 CAPSULE ORAL
Qty: 12 CAPSULE | Refills: 3 | Status: ON HOLD | OUTPATIENT
Start: 2021-08-11 | End: 2022-08-29

## 2021-08-27 ENCOUNTER — DOCUMENTATION ONLY (OUTPATIENT)
Dept: REHABILITATION | Facility: HOSPITAL | Age: 31
End: 2021-08-27

## 2021-08-27 PROBLEM — R33.9 URINARY RETENTION: Status: RESOLVED | Noted: 2021-04-26 | Resolved: 2021-08-27

## 2021-08-27 PROBLEM — R35.0 URINARY FREQUENCY: Status: RESOLVED | Noted: 2021-04-26 | Resolved: 2021-08-27

## 2021-08-27 PROBLEM — K59.00 CONSTIPATION: Status: RESOLVED | Noted: 2021-04-26 | Resolved: 2021-08-27

## 2021-10-06 ENCOUNTER — PATIENT MESSAGE (OUTPATIENT)
Dept: OBSTETRICS AND GYNECOLOGY | Facility: CLINIC | Age: 31
End: 2021-10-06

## 2021-10-19 ENCOUNTER — PATIENT MESSAGE (OUTPATIENT)
Dept: OBSTETRICS AND GYNECOLOGY | Facility: CLINIC | Age: 31
End: 2021-10-19
Payer: MEDICAID

## 2021-10-21 RX ORDER — DROSPIRENONE AND ETHINYL ESTRADIOL 0.03MG-3MG
1 KIT ORAL DAILY
Qty: 90 TABLET | Refills: 3 | Status: ON HOLD | OUTPATIENT
Start: 2021-10-21 | End: 2022-08-29

## 2021-12-15 ENCOUNTER — HOSPITAL ENCOUNTER (OUTPATIENT)
Dept: RADIOLOGY | Facility: HOSPITAL | Age: 31
Discharge: HOME | End: 2021-12-15
Attending: PHYSICIAN ASSISTANT
Payer: MEDICAID

## 2021-12-15 ENCOUNTER — OFFICE VISIT (OUTPATIENT)
Dept: GASTROENTEROLOGY | Facility: CLINIC | Age: 31
End: 2021-12-15
Payer: MEDICAID

## 2021-12-15 ENCOUNTER — PATIENT MESSAGE (OUTPATIENT)
Dept: GASTROENTEROLOGY | Facility: CLINIC | Age: 31
End: 2021-12-15

## 2021-12-15 VITALS
HEART RATE: 88 BPM | SYSTOLIC BLOOD PRESSURE: 134 MMHG | WEIGHT: 172.94 LBS | HEIGHT: 66 IN | DIASTOLIC BLOOD PRESSURE: 92 MMHG | BODY MASS INDEX: 27.79 KG/M2

## 2021-12-15 DIAGNOSIS — R14.0 BLOATING: ICD-10-CM

## 2021-12-15 DIAGNOSIS — R19.7 DIARRHEA, UNSPECIFIED TYPE: ICD-10-CM

## 2021-12-15 DIAGNOSIS — K59.00 CONSTIPATION, UNSPECIFIED CONSTIPATION TYPE: Primary | ICD-10-CM

## 2021-12-15 DIAGNOSIS — K59.00 CONSTIPATION, UNSPECIFIED CONSTIPATION TYPE: ICD-10-CM

## 2021-12-15 PROCEDURE — 99999 PR PBB SHADOW E&M-EST. PATIENT-LVL III: CPT | Mod: PBBFAC,,, | Performed by: PHYSICIAN ASSISTANT

## 2021-12-15 PROCEDURE — 74019 XR ABDOMEN FLAT AND ERECT: ICD-10-PCS | Mod: 26,,, | Performed by: RADIOLOGY

## 2021-12-15 PROCEDURE — 99999 PR PBB SHADOW E&M-EST. PATIENT-LVL III: ICD-10-PCS | Mod: PBBFAC,,, | Performed by: PHYSICIAN ASSISTANT

## 2021-12-15 PROCEDURE — 99214 PR OFFICE/OUTPT VISIT, EST, LEVL IV, 30-39 MIN: ICD-10-PCS | Mod: S$PBB,,, | Performed by: PHYSICIAN ASSISTANT

## 2021-12-15 PROCEDURE — 99214 OFFICE O/P EST MOD 30 MIN: CPT | Mod: S$PBB,,, | Performed by: PHYSICIAN ASSISTANT

## 2021-12-15 PROCEDURE — 74019 RADEX ABDOMEN 2 VIEWS: CPT | Mod: TC

## 2021-12-15 PROCEDURE — 74019 RADEX ABDOMEN 2 VIEWS: CPT | Mod: 26,,, | Performed by: RADIOLOGY

## 2021-12-15 PROCEDURE — 99213 OFFICE O/P EST LOW 20 MIN: CPT | Mod: PBBFAC | Performed by: PHYSICIAN ASSISTANT

## 2021-12-15 RX ORDER — HYDROXYZINE HYDROCHLORIDE 25 MG/1
25 TABLET, FILM COATED ORAL DAILY
COMMUNITY
Start: 2021-12-07 | End: 2022-12-29

## 2021-12-15 RX ORDER — MONTELUKAST SODIUM 10 MG/1
10 TABLET ORAL DAILY
COMMUNITY
Start: 2021-10-06 | End: 2023-11-09

## 2022-01-24 ENCOUNTER — PATIENT MESSAGE (OUTPATIENT)
Dept: GASTROENTEROLOGY | Facility: CLINIC | Age: 32
End: 2022-01-24
Payer: MEDICAID

## 2022-01-25 ENCOUNTER — PATIENT MESSAGE (OUTPATIENT)
Dept: GASTROENTEROLOGY | Facility: CLINIC | Age: 32
End: 2022-01-25
Payer: MEDICAID

## 2022-04-11 ENCOUNTER — PATIENT MESSAGE (OUTPATIENT)
Dept: OBSTETRICS AND GYNECOLOGY | Facility: CLINIC | Age: 32
End: 2022-04-11
Payer: MEDICAID

## 2022-05-25 ENCOUNTER — PATIENT MESSAGE (OUTPATIENT)
Dept: OBSTETRICS AND GYNECOLOGY | Facility: CLINIC | Age: 32
End: 2022-05-25
Payer: MEDICAID

## 2022-07-11 ENCOUNTER — OFFICE VISIT (OUTPATIENT)
Dept: GASTROENTEROLOGY | Facility: CLINIC | Age: 32
End: 2022-07-11
Payer: MEDICAID

## 2022-07-11 ENCOUNTER — DOCUMENTATION ONLY (OUTPATIENT)
Dept: GASTROENTEROLOGY | Facility: CLINIC | Age: 32
End: 2022-07-11

## 2022-07-11 DIAGNOSIS — R14.0 BLOATING: ICD-10-CM

## 2022-07-11 DIAGNOSIS — K59.00 CONSTIPATION, UNSPECIFIED CONSTIPATION TYPE: Primary | ICD-10-CM

## 2022-07-11 PROCEDURE — 4010F PR ACE/ARB THEARPY RXD/TAKEN: ICD-10-PCS | Mod: CPTII,95,, | Performed by: PHYSICIAN ASSISTANT

## 2022-07-11 PROCEDURE — 99214 PR OFFICE/OUTPT VISIT, EST, LEVL IV, 30-39 MIN: ICD-10-PCS | Mod: 95,,, | Performed by: PHYSICIAN ASSISTANT

## 2022-07-11 PROCEDURE — 99214 OFFICE O/P EST MOD 30 MIN: CPT | Mod: 95,,, | Performed by: PHYSICIAN ASSISTANT

## 2022-07-11 PROCEDURE — 4010F ACE/ARB THERAPY RXD/TAKEN: CPT | Mod: CPTII,95,, | Performed by: PHYSICIAN ASSISTANT

## 2022-07-11 RX ORDER — LUBIPROSTONE 24 UG/1
24 CAPSULE ORAL 2 TIMES DAILY WITH MEALS
Qty: 60 CAPSULE | Refills: 2 | Status: SHIPPED | OUTPATIENT
Start: 2022-07-11 | End: 2022-10-09

## 2022-07-11 RX ORDER — SODIUM, POTASSIUM,MAG SULFATES 17.5-3.13G
1 SOLUTION, RECONSTITUTED, ORAL ORAL DAILY
Qty: 1 KIT | Refills: 0 | Status: SHIPPED | OUTPATIENT
Start: 2022-07-11 | End: 2022-07-13

## 2022-07-11 NOTE — PROGRESS NOTES
Subjective:      Patient ID: Yumiko Bautista is a 31 y.o. female.    Chief Complaint: No chief complaint on file.  The patient location is: home   The chief complaint leading to consultation is: constipation    Visit type: audiovisual    Face to Face time with patient: 15 mins  20 minutes of total time spent on the encounter, which includes face to face time and non-face to face time preparing to see the patient (eg, review of tests), Obtaining and/or reviewing separately obtained history, Documenting clinical information in the electronic or other health record, Independently interpreting results (not separately reported) and communicating results to the patient/family/caregiver, or Care coordination (not separately reported).     Each patient to whom he or she provides medical services by telemedicine is:  (1) informed of the relationship between the physician and patient and the respective role of any other health care provider with respect to management of the patient; and (2) notified that he or she may decline to receive medical services by telemedicine and may withdraw from such care at any time.    Notes:   HPI:  Patient reports to clinic today for follow up of the above. She has struggled for years with constipation. Has tried OTC remedies including Miralax with no improvement. Has tried 2 different dosings of Linzess with no improvement. She is currently taking Miralax and continues with bloating and constipation. She has never had a colonoscopy - reports her previous GI mentioned doing one but it was never scheduled.   Took mag citrate last week which produced a good bowel movement and her bloating improved significantly. It has now returned.    Review of Systems   Constitutional: Negative for activity change, appetite change, chills, diaphoresis, fatigue and fever.   Respiratory: Negative for shortness of breath.    Cardiovascular: Negative for chest pain.   Gastrointestinal: Positive for abdominal  distention, abdominal pain and constipation. Negative for anal bleeding, blood in stool, diarrhea, nausea and vomiting.   Neurological: Negative for dizziness and weakness.   Psychiatric/Behavioral: Negative for dysphoric mood. The patient is not nervous/anxious.        Medical History: Reviewed    Social History: Reviewed    Allergies: Reviewed    Objective:     Physical Exam  Constitutional:       General: She is not in acute distress.     Appearance: Normal appearance. She is not ill-appearing, toxic-appearing or diaphoretic.   Neurological:      Mental Status: She is alert.         Assessment:     1. Constipation, unspecified constipation type    2. Bloating        Plan:     -Stop Linzess. Try Amitiza BID. If too strong can decrease to once daily.  -Schedule for colonoscopy for further evaluation of constipation and significant bloating.   -suprep  -Follow up after colonoscopy.     Diagnoses and all orders for this visit:    Constipation, unspecified constipation type  -     lubiprostone (AMITIZA) 24 MCG Cap; Take 1 capsule (24 mcg total) by mouth 2 (two) times daily with meals.  -     Case Request Endoscopy: COLONOSCOPY  -     sodium,potassium,mag sulfates (SUPREP BOWEL PREP KIT) 17.5-3.13-1.6 gram SolR; Take 177 mLs by mouth once daily. for 2 days    Bloating  -     lubiprostone (AMITIZA) 24 MCG Cap; Take 1 capsule (24 mcg total) by mouth 2 (two) times daily with meals.  -     Case Request Endoscopy: COLONOSCOPY  -     sodium,potassium,mag sulfates (SUPREP BOWEL PREP KIT) 17.5-3.13-1.6 gram SolR; Take 177 mLs by mouth once daily. for 2 days        No follow-ups on file.    Thank you for the opportunity to participate in the care of this patient.   Felisa Arevalo PA-C.

## 2022-08-24 NOTE — PROGRESS NOTES
Procedure instructions reviewed. Place, time, clear liquids only on day before procedure no solid food at all, nothing to eat or drink after 2 am dose of prep on am of procedure, no chewing gum or sucking on candy, do not take any medications on am of procedure, have someone to drive them home and bowel prep instructions reviewed with pt. Instructions sent to [facility].  Pt verbalized understanding.

## 2022-08-29 ENCOUNTER — ANESTHESIA (OUTPATIENT)
Dept: ENDOSCOPY | Facility: HOSPITAL | Age: 32
End: 2022-08-29
Payer: MEDICAID

## 2022-08-29 ENCOUNTER — ANESTHESIA EVENT (OUTPATIENT)
Dept: ENDOSCOPY | Facility: HOSPITAL | Age: 32
End: 2022-08-29
Payer: MEDICAID

## 2022-08-29 ENCOUNTER — HOSPITAL ENCOUNTER (OUTPATIENT)
Facility: HOSPITAL | Age: 32
Discharge: HOME | End: 2022-08-29
Attending: INTERNAL MEDICINE | Admitting: INTERNAL MEDICINE
Payer: MEDICAID

## 2022-08-29 DIAGNOSIS — K59.09 CHRONIC CONSTIPATION: Primary | ICD-10-CM

## 2022-08-29 DIAGNOSIS — R14.0 BLOATING: ICD-10-CM

## 2022-08-29 LAB
B-HCG UR QL: NEGATIVE
CTP QC/QA: YES
CTP QC/QA: YES
GLUCOSE SERPL-MCNC: NEGATIVE MG/DL (ref 70–110)
SARS-COV-2 AG RESP QL IA.RAPID: NEGATIVE

## 2022-08-29 PROCEDURE — 45378 PR COLONOSCOPY,DIAGNOSTIC: ICD-10-PCS | Mod: ,,, | Performed by: INTERNAL MEDICINE

## 2022-08-29 PROCEDURE — 81025 URINE PREGNANCY TEST: CPT | Performed by: INTERNAL MEDICINE

## 2022-08-29 PROCEDURE — 45378 DIAGNOSTIC COLONOSCOPY: CPT | Performed by: INTERNAL MEDICINE

## 2022-08-29 PROCEDURE — 45378 DIAGNOSTIC COLONOSCOPY: CPT | Mod: ,,, | Performed by: INTERNAL MEDICINE

## 2022-08-29 PROCEDURE — 37000009 HC ANESTHESIA EA ADD 15 MINS: Performed by: INTERNAL MEDICINE

## 2022-08-29 PROCEDURE — 00811 ANES LWR INTST NDSC NOS: CPT | Performed by: INTERNAL MEDICINE

## 2022-08-29 PROCEDURE — 37000008 HC ANESTHESIA 1ST 15 MINUTES: Performed by: INTERNAL MEDICINE

## 2022-08-29 PROCEDURE — 63600175 PHARM REV CODE 636 W HCPCS: Performed by: NURSE ANESTHETIST, CERTIFIED REGISTERED

## 2022-08-29 PROCEDURE — 25000003 PHARM REV CODE 250: Performed by: NURSE ANESTHETIST, CERTIFIED REGISTERED

## 2022-08-29 RX ORDER — LIDOCAINE HYDROCHLORIDE 10 MG/ML
INJECTION, SOLUTION EPIDURAL; INFILTRATION; INTRACAUDAL; PERINEURAL
Status: DISCONTINUED | OUTPATIENT
Start: 2022-08-29 | End: 2022-08-29

## 2022-08-29 RX ORDER — LISINOPRIL 20 MG/1
10 TABLET ORAL DAILY
COMMUNITY
End: 2024-01-25

## 2022-08-29 RX ORDER — PROPOFOL 10 MG/ML
VIAL (ML) INTRAVENOUS
Status: DISCONTINUED | OUTPATIENT
Start: 2022-08-29 | End: 2022-08-29

## 2022-08-29 RX ORDER — SODIUM CHLORIDE, SODIUM LACTATE, POTASSIUM CHLORIDE, CALCIUM CHLORIDE 600; 310; 30; 20 MG/100ML; MG/100ML; MG/100ML; MG/100ML
INJECTION, SOLUTION INTRAVENOUS CONTINUOUS
Status: DISCONTINUED | OUTPATIENT
Start: 2022-08-29 | End: 2022-08-29 | Stop reason: HOSPADM

## 2022-08-29 RX ORDER — SODIUM CHLORIDE 9 MG/ML
INJECTION, SOLUTION INTRAVENOUS CONTINUOUS
Status: DISCONTINUED | OUTPATIENT
Start: 2022-08-29 | End: 2022-08-29 | Stop reason: HOSPADM

## 2022-08-29 RX ORDER — FLUTICASONE PROPIONATE 50 MCG
1 SPRAY, SUSPENSION (ML) NASAL DAILY
COMMUNITY

## 2022-08-29 RX ORDER — BUSPIRONE HYDROCHLORIDE 15 MG/1
15 TABLET ORAL 2 TIMES DAILY
COMMUNITY
End: 2023-11-09

## 2022-08-29 RX ADMIN — PROPOFOL 40 MG: 10 INJECTION, EMULSION INTRAVENOUS at 10:08

## 2022-08-29 RX ADMIN — LIDOCAINE HYDROCHLORIDE 50 MG: 10 INJECTION, SOLUTION EPIDURAL; INFILTRATION; INTRACAUDAL; PERINEURAL at 10:08

## 2022-08-29 RX ADMIN — SODIUM CHLORIDE, SODIUM LACTATE, POTASSIUM CHLORIDE, AND CALCIUM CHLORIDE: .6; .31; .03; .02 INJECTION, SOLUTION INTRAVENOUS at 09:08

## 2022-08-29 RX ADMIN — PROPOFOL 100 MG: 10 INJECTION, EMULSION INTRAVENOUS at 10:08

## 2022-08-29 RX ADMIN — PROPOFOL 50 MG: 10 INJECTION, EMULSION INTRAVENOUS at 10:08

## 2022-08-29 NOTE — ANESTHESIA PREPROCEDURE EVALUATION
08/29/2022  Yumiko Bautista is a 31 y.o., female.      Pre-op Assessment    I have reviewed the Patient Summary Reports.     I have reviewed the Nursing Notes. I have reviewed the NPO Status.   I have reviewed the Medications.     Review of Systems  Anesthesia Hx:  No problems with previous Anesthesia    Social:  Non-Smoker    Hematology/Oncology:  Hematology Normal   Oncology Normal     EENT/Dental:EENT/Dental Normal   Cardiovascular:   Hypertension, well controlled    Pulmonary:  Pulmonary Normal    Renal/:  Renal/ Normal     Hepatic/GI:   Bowel Prep.    Musculoskeletal:  Musculoskeletal Normal    Neurological:  Neurology Normal    Endocrine:  Endocrine Normal    Psych:   Psychiatric History anxiety depression          Physical Exam  General: Well nourished    Airway:  Mallampati: II   Mouth Opening: Normal  TM Distance: Normal  Tongue: Normal  Neck ROM: Normal ROM    Dental:  Intact    Chest/Lungs:  Clear to auscultation    Heart:  Rate: Normal        Anesthesia Plan  Type of Anesthesia, risks & benefits discussed:    Anesthesia Type: MAC  Intra-op Monitoring Plan: Standard ASA Monitors  Induction:  IV  Informed Consent: Informed consent signed with the Patient and all parties understand the risks and agree with anesthesia plan.  All questions answered. Patient consented to blood products? Yes  ASA Score: 2    Ready For Surgery From Anesthesia Perspective.     .

## 2022-08-29 NOTE — ANESTHESIA RELEASE NOTE
"Anesthesia Release from PACU Note    Patient: uYmiko Bautista    Procedure(s) Performed: Procedure(s) (LRB):  COLONOSCOPY (N/A)    Anesthesia type: MAC    Post pain: Adequate analgesia    Post assessment: no apparent anesthetic complications    Last Vitals:   Visit Vitals  BP (!) 140/95 (BP Location: Left arm, Patient Position: Lying)   Pulse 86   Temp 36.8 °C (98.2 °F)   Resp 20   Ht 5' 6" (1.676 m)   Wt 78.5 kg (173 lb)   LMP 08/26/2022   SpO2 99%   Breastfeeding No   BMI 27.92 kg/m²       Post vital signs: stable    Level of consciousness: awake    Nausea/Vomiting: no nausea/no vomiting    Complications: none    Airway Patency: patent    Respiratory: unassisted    Cardiovascular: stable and blood pressure at baseline    Hydration: euvolemic     "

## 2022-08-29 NOTE — DISCHARGE SUMMARY
[facility] - Endoscopy (Hospital)  Discharge Note  Short Stay    Procedure(s) (LRB):  COLONOSCOPY (N/A)    OUTCOME: Patient tolerated treatment/procedure well without complication and is now ready for discharge.    DISPOSITION: Home or Self Care    FINAL DIAGNOSIS:  Chronic constipation    FOLLOWUP: With primary care provider    DISCHARGE INSTRUCTIONS:  No discharge procedures on file.

## 2022-08-29 NOTE — ANESTHESIA POSTPROCEDURE EVALUATION
Anesthesia Post Evaluation    Patient: Yumiko Bautista    Procedure(s) Performed: Procedure(s) (LRB):  COLONOSCOPY (N/A)    Final Anesthesia Type: MAC      Patient location during evaluation: PACU  Patient participation: Yes- Able to Participate  Level of consciousness: awake and alert  Post-procedure vital signs: reviewed and stable  Pain management: adequate  Airway patency: patent    PONV status at discharge: No PONV  Anesthetic complications: no      Cardiovascular status: blood pressure returned to baseline  Respiratory status: unassisted  Hydration status: euvolemic  Follow-up not needed.          Vitals Value Taken Time   /66 08/29/22 1039   Temp 98 08/29/22 1039   Pulse 66 08/29/22 1039   Resp 12 ``   SpO2 98 08/29/22 1039         No case tracking events are documented in the log.      Pain/Mariia Score: No data recorded

## 2022-08-29 NOTE — TRANSFER OF CARE
"Anesthesia Transfer of Care Note    Patient: Yumiko Bautista    Procedure(s) Performed: Procedure(s) (LRB):  COLONOSCOPY (N/A)    Patient location: PACU    Anesthesia Type: MAC    Transport from OR: Transported from OR on room air with adequate spontaneous ventilation    Post pain: adequate analgesia    Post assessment: no apparent anesthetic complications    Post vital signs: stable    Level of consciousness: awake    Nausea/Vomiting: no nausea/vomiting    Complications: none    Transfer of care protocol was followed      Last vitals:   Visit Vitals  BP (!) 140/95 (BP Location: Left arm, Patient Position: Lying)   Pulse 86   Temp 36.8 °C (98.2 °F)   Resp 20   Ht 5' 6" (1.676 m)   Wt 78.5 kg (173 lb)   LMP 08/26/2022   SpO2 99%   Breastfeeding No   BMI 27.92 kg/m²     "

## 2022-08-29 NOTE — [employer] PATIENT INSTRUCTIONS
Discharge Summary/Instructions after an Endoscopic Procedure  Patient Name: Yumiko Bautista  Patient MRN: [id number]  Patient YOB: 1990 Monday, August 29, 2022 Mirna Alan MD  Dear patient,  As a result of recent federal legislation (The Federal Cures Act), you may   receive lab or pathology results from your procedure in your [facility]   account before your physician is able to contact you. Your physician or   their representative will relay the results to you with their   recommendations at their soonest availability.  Thank you,  RESTRICTIONS:  During your procedure today, you received medications for sedation.  These   medications may affect your judgment, balance and coordination.  Therefore,   for 24 hours, you have the following restrictions:   - DO NOT drive a car, operate machinery, make legal/financial decisions,   sign important papers or drink alcohol.    ACTIVITY:  Today: no heavy lifting, straining or running due to procedural   sedation/anesthesia.  The following day: return to full activity including work.  DIET:  Eat and drink normally unless instructed otherwise.     TREATMENT FOR COMMON SIDE EFFECTS:  - Mild abdominal pain, nausea, belching, bloating or excessive gas:  rest,   eat lightly and use a heating pad.  - Sore Throat: treat with throat lozenges and/or gargle with warm salt   water.  - Because air was used during the procedure, expelling large amounts of air   from your rectum or belching is normal.  - If a bowel prep was taken, you may not have a bowel movement for 1-3 days.    This is normal.  SYMPTOMS TO WATCH FOR AND REPORT TO YOUR PHYSICIAN:  1. Abdominal pain or bloating, other than gas cramps.  2. Chest pain.  3. Back pain.  4. Signs of infection such as: chills or fever occurring within 24 hours   after the procedure.  5. Rectal bleeding, which would show as bright red, maroon, or black stools.   (A tablespoon of blood from the rectum is not serious, especially  if   hemorrhoids are present.)  6. Vomiting.  7. Weakness or dizziness.  GO DIRECTLY TO THE NEAREST EMERGENCY ROOM IF YOU HAVE ANY OF THE FOLLOWING:      Difficulty breathing              Chills and/or fever over 101 F   Persistent vomiting and/or vomiting blood   Severe abdominal pain   Severe chest pain   Black, tarry stools   Bleeding- more than one tablespoon   Any other symptom or condition that you feel may need urgent attention  Your doctor recommends these additional instructions:  If any biopsies were taken, your doctors clinic will contact you in 1 to 2   weeks with any results.  - Discharge patient to home (via wheelchair).   - Resume previous diet.   - Continue present medications.   - Patient has a contact number available for emergencies.  The signs and   symptoms of potential delayed complications were discussed with the   patient.  Return to normal activities tomorrow.  Written discharge   instructions were provided to the patient.   - Repeat colonoscopy at age 45 for screening purposes.  For questions, problems or results please call your physician Mirna Alan MD at Work:  [phone]  If you have any questions about the above instructions, call the GI   department at [phone] or call the endoscopy unit at [phone]   from 7am until 3 pm.  [facility] [place], EMERGENCY ROOM PHONE NUMBER:   [phone]  IF A COMPLICATION OR EMERGENCY SITUATION ARISES AND YOU ARE UNABLE TO REACH   YOUR PHYSICIAN - GO DIRECTLY TO THE EMERGENCY ROOM.  I have read or have had read to me these discharge instructions for my   procedure and have received a written copy.  I understand these   instructions and will follow-up with my physician if I have any questions.     __________________________________       _____________________________________  Nurse Signature                                          Patient/Designated   Responsible Party Signature  MD Mirna Barry  LEONARDO Alan MD  8/29/2022 10:36:20 AM  [employer]

## 2022-08-29 NOTE — PLAN OF CARE
Dr Alan came to bedside and discussed findings. NO N/V,  no abdominal pain, no GI bleeding, and vitals stable.  Pt discharged from unit.

## 2022-08-29 NOTE — H&P
Short Stay Endoscopy History and Physical    PCP - Primary Doctor No    Procedure - Colonoscopy  ASA - 2  Mallampati - per anesthesia  History of Anesthesia problems - no  Family history Anesthesia problems -  no     HPI:  This is a 31 y.o. female here for evaluation of :   Active Hospital Problems    Diagnosis  POA    *Chronic constipation [K59.09]  Yes    Bloating [R14.0]  No      Resolved Hospital Problems   No resolved problems to display.         Health Maintenance         Date Due Completion Date    Hepatitis C Screening Never done ---    Cervical Cancer Screening Never done ---    COVID-19 Vaccine (1) Never done ---    TETANUS VACCINE Never done ---    Influenza Vaccine (1) 09/01/2022 ---            ROS:  CONSTITUTIONAL: Denies weight change,  fatigue, fevers, chills, night sweats.  CARDIOVASCULAR: Denies chest pain, shortness of breath, orthopnea and edema.  RESPIRATORY: Denies cough, hemoptysis, dyspnea, and wheezing.  GI: See HPI.    Medical History:   Past Medical History:   Diagnosis Date    Hypertension     Mental disorder     Bi polar    Vaginismus     treated w/ physical therapy       Surgical History:   Past Surgical History:   Procedure Laterality Date    cyst removal      labial     SKIN BIOPSY Left     pre-cancerous cells on left foot removed       Family History:   Family History   Problem Relation Age of Onset    Hypertension Mother     Hypertension Paternal Grandfather     Ovarian cancer Paternal Grandmother     Hypertension Paternal Grandmother     Hypertension Maternal Grandmother     Hypertension Maternal Grandfather     Breast cancer Other         mat great GM    Colon cancer Neg Hx     Thrombosis Neg Hx        Social History:   Social History     Tobacco Use    Smoking status: Never    Smokeless tobacco: Never   Substance Use Topics    Alcohol use: Not Currently     Comment: socially    Drug use: No       Allergies:   Review of patient's allergies indicates:  No Known  Allergies    Medications:   No current facility-administered medications on file prior to encounter.     Current Outpatient Medications on File Prior to Encounter   Medication Sig Dispense Refill    busPIRone (BUSPAR) 15 MG tablet Take 15 mg by mouth 2 (two) times daily.      fluticasone propionate (FLONASE) 50 mcg/actuation nasal spray 1 spray by Each Nostril route once daily.      hydrOXYzine HCL (ATARAX) 25 MG tablet Take 25 mg by mouth once daily.      lamoTRIgine (LAMICTAL) 200 MG tablet Take 200 mg by mouth once daily.      lisinopriL (PRINIVIL,ZESTRIL) 20 MG tablet Take 20 mg by mouth once daily.      lubiprostone (AMITIZA) 24 MCG Cap Take 1 capsule (24 mcg total) by mouth 2 (two) times daily with meals. 60 capsule 2    montelukast (SINGULAIR) 10 mg tablet Take 10 mg by mouth once daily.      [DISCONTINUED] drospirenone-ethinyl estradioL (MATHEW) 3-0.03 mg per tablet Take 1 tablet by mouth once daily. 90 tablet 3    [DISCONTINUED] ergocalciferol (ERGOCALCIFEROL) 50,000 unit Cap Take 1 capsule (50,000 Units total) by mouth every 7 days. (Patient not taking: Reported on 12/15/2021) 12 capsule 3       Physical Exam:  Vital Signs:   Vitals:    08/29/22 0932   BP: (!) 140/95   Pulse: 86   Resp: 20   Temp: 98.2 °F (36.8 °C)     General Appearance: Well appearing in no acute distress  ENT: OP clear  Chest: CTA B  CV: RRR, no m/r/g  Abd: s/nt/nd/nabs  Ext: no edema    Labs:Reviewed    IMP:  Active Hospital Problems    Diagnosis  POA    *Chronic constipation [K59.09]  Yes    Bloating [R14.0]  No      Resolved Hospital Problems   No resolved problems to display.         Plan:   I have explained the risks and benefits of colonoscopy to the patient including but not limited to bleeding, perforation, infection, and death. The patient wishes to proceed.

## 2022-08-30 VITALS
WEIGHT: 173 LBS | OXYGEN SATURATION: 100 % | DIASTOLIC BLOOD PRESSURE: 78 MMHG | BODY MASS INDEX: 27.8 KG/M2 | HEART RATE: 76 BPM | HEIGHT: 66 IN | TEMPERATURE: 98 F | RESPIRATION RATE: 17 BRPM | SYSTOLIC BLOOD PRESSURE: 132 MMHG

## 2023-03-09 ENCOUNTER — OFFICE VISIT (OUTPATIENT)
Dept: GASTROENTEROLOGY | Facility: CLINIC | Age: 33
End: 2023-03-09
Payer: MEDICAID

## 2023-03-09 VITALS
SYSTOLIC BLOOD PRESSURE: 124 MMHG | DIASTOLIC BLOOD PRESSURE: 78 MMHG | HEIGHT: 66 IN | WEIGHT: 164 LBS | BODY MASS INDEX: 26.36 KG/M2 | HEART RATE: 80 BPM

## 2023-03-09 DIAGNOSIS — K59.00 CONSTIPATION, UNSPECIFIED CONSTIPATION TYPE: ICD-10-CM

## 2023-03-09 DIAGNOSIS — R13.10 DYSPHAGIA, UNSPECIFIED TYPE: Primary | ICD-10-CM

## 2023-03-09 PROCEDURE — 99214 PR OFFICE/OUTPT VISIT, EST, LEVL IV, 30-39 MIN: ICD-10-PCS | Mod: S$PBB,,, | Performed by: PHYSICIAN ASSISTANT

## 2023-03-09 PROCEDURE — 99213 OFFICE O/P EST LOW 20 MIN: CPT | Mod: PBBFAC | Performed by: PHYSICIAN ASSISTANT

## 2023-03-09 PROCEDURE — 3008F PR BODY MASS INDEX (BMI) DOCUMENTED: ICD-10-PCS | Mod: CPTII,,, | Performed by: PHYSICIAN ASSISTANT

## 2023-03-09 PROCEDURE — 1159F MED LIST DOCD IN RCRD: CPT | Mod: CPTII,,, | Performed by: PHYSICIAN ASSISTANT

## 2023-03-09 PROCEDURE — 3074F PR MOST RECENT SYSTOLIC BLOOD PRESSURE < 130 MM HG: ICD-10-PCS | Mod: CPTII,,, | Performed by: PHYSICIAN ASSISTANT

## 2023-03-09 PROCEDURE — 3008F BODY MASS INDEX DOCD: CPT | Mod: CPTII,,, | Performed by: PHYSICIAN ASSISTANT

## 2023-03-09 PROCEDURE — 3078F DIAST BP <80 MM HG: CPT | Mod: CPTII,,, | Performed by: PHYSICIAN ASSISTANT

## 2023-03-09 PROCEDURE — 99999 PR PBB SHADOW E&M-EST. PATIENT-LVL III: CPT | Mod: PBBFAC,,, | Performed by: PHYSICIAN ASSISTANT

## 2023-03-09 PROCEDURE — 99214 OFFICE O/P EST MOD 30 MIN: CPT | Mod: S$PBB,,, | Performed by: PHYSICIAN ASSISTANT

## 2023-03-09 PROCEDURE — 1159F PR MEDICATION LIST DOCUMENTED IN MEDICAL RECORD: ICD-10-PCS | Mod: CPTII,,, | Performed by: PHYSICIAN ASSISTANT

## 2023-03-09 PROCEDURE — 3074F SYST BP LT 130 MM HG: CPT | Mod: CPTII,,, | Performed by: PHYSICIAN ASSISTANT

## 2023-03-09 PROCEDURE — 4010F PR ACE/ARB THEARPY RXD/TAKEN: ICD-10-PCS | Mod: CPTII,,, | Performed by: PHYSICIAN ASSISTANT

## 2023-03-09 PROCEDURE — 4010F ACE/ARB THERAPY RXD/TAKEN: CPT | Mod: CPTII,,, | Performed by: PHYSICIAN ASSISTANT

## 2023-03-09 PROCEDURE — 3078F PR MOST RECENT DIASTOLIC BLOOD PRESSURE < 80 MM HG: ICD-10-PCS | Mod: CPTII,,, | Performed by: PHYSICIAN ASSISTANT

## 2023-03-09 PROCEDURE — 99999 PR PBB SHADOW E&M-EST. PATIENT-LVL III: ICD-10-PCS | Mod: PBBFAC,,, | Performed by: PHYSICIAN ASSISTANT

## 2023-03-09 RX ORDER — HYDROXYZINE HYDROCHLORIDE 50 MG/1
1 TABLET, FILM COATED ORAL NIGHTLY PRN
COMMUNITY
Start: 2022-06-03 | End: 2024-01-25

## 2023-03-09 RX ORDER — PANTOPRAZOLE SODIUM 40 MG/1
40 TABLET, DELAYED RELEASE ORAL DAILY
Qty: 30 TABLET | Refills: 11 | Status: SHIPPED | OUTPATIENT
Start: 2023-03-09 | End: 2023-11-09

## 2023-03-09 RX ORDER — ONDANSETRON 4 MG/1
4 TABLET, ORALLY DISINTEGRATING ORAL EVERY 6 HOURS PRN
COMMUNITY
Start: 2023-02-01 | End: 2023-11-09

## 2023-03-09 RX ORDER — PLECANATIDE 3 MG/1
3 TABLET ORAL DAILY
Qty: 30 TABLET | Refills: 3 | Status: SHIPPED | OUTPATIENT
Start: 2023-03-09 | End: 2023-06-01 | Stop reason: ALTCHOICE

## 2023-03-09 RX ORDER — BUPROPION HYDROCHLORIDE 150 MG/1
150 TABLET ORAL EVERY MORNING
COMMUNITY
Start: 2023-03-06 | End: 2024-01-25

## 2023-03-09 NOTE — PROGRESS NOTES
Subjective:      Patient ID: Yumiko Bautista is a 32 y.o. female.    Chief Complaint: Constipation and Gastroesophageal Reflux    HPI:  Patient reports to clinic today for follow up of the above. Last seen mid 2022 with similar complaints. Constipation continues but seems to be worse now than it was previously. Has tried Linzess and Miralax with no benefit. At our last visit, colonoscopy was ordered as well as high dose Amitiza. Colonoscopy was normal. Patient does not recall if she tried Amitiza or not.   She often feels the sensation to have a bowel movement but can't. When she finally has a bowel movement, it is a large stool. Formed but soft - denies pellet stools. May have a bowel movement once every 3-5 days. Endorses intermittent bloating. Decreases after bowel movement. Has tried PFT in the past, but recently just restarted therapy at [facility]. Also mentions a newer issue of trouble swallowing. No issues with food. Has issue occasionally with liquids and taking medication. Feels like some pills will come back up directly after swallowing them. She is not currently on any PPI therapy. Endorses acid reflux sensation.    Review of Systems   Constitutional:  Negative for activity change, appetite change, chills, diaphoresis, fatigue, fever and unexpected weight change.   HENT:  Positive for trouble swallowing.    Respiratory:  Negative for cough and shortness of breath.    Cardiovascular:  Negative for chest pain.   Gastrointestinal:  Positive for abdominal distention, abdominal pain and constipation. Negative for anal bleeding, blood in stool, diarrhea, nausea and vomiting.   Musculoskeletal:  Negative for back pain.   Neurological:  Negative for dizziness and weakness.     Medical History: Reviewed    Social History: Reviewed    Allergies: Reviewed    Objective:     Physical Exam  Constitutional:       General: She is not in acute distress.     Appearance: Normal appearance. She is not ill-appearing,  toxic-appearing or diaphoretic.   HENT:      Head: Normocephalic and atraumatic.   Eyes:      General: No scleral icterus.     Extraocular Movements: Extraocular movements intact.   Cardiovascular:      Rate and Rhythm: Normal rate and regular rhythm.   Pulmonary:      Effort: Pulmonary effort is normal. No respiratory distress.      Breath sounds: Normal breath sounds.   Abdominal:      General: Bowel sounds are normal. There is no distension.      Palpations: Abdomen is soft.      Tenderness: There is no abdominal tenderness. There is no guarding.   Musculoskeletal:         General: Normal range of motion.      Cervical back: Normal range of motion.   Skin:     General: Skin is warm and dry.   Neurological:      Mental Status: She is alert and oriented to person, place, and time.       Assessment:     1. Dysphagia, unspecified type    2. Constipation, unspecified constipation type        Plan:     -Constipation is chronic. Tried Miralax, Linzess and possibly Amitiza with no benefit. Trial of Trulance. Colonoscopy completed and normal.   -Begin Protonix given reflux and new issue of trouble swallowing. Unsure if true dysphagia. Trial of Protonix and close follow up. If symptoms persists, will complete swallow studies vs. EGD.  -Instructed to reach out if symptoms persist.    Yumiko was seen today for constipation and gastroesophageal reflux.    Diagnoses and all orders for this visit:    Dysphagia, unspecified type  -     pantoprazole (PROTONIX) 40 MG tablet; Take 1 tablet (40 mg total) by mouth once daily.    Constipation, unspecified constipation type  -     plecanatide (TRULANCE) 3 mg Tab; Take 3 mg by mouth once daily.        Follow up in about 8 weeks (around 5/4/2023).    Thank you for the opportunity to participate in the care of this patient.   Felisa Arevalo PA-C.

## 2023-03-13 ENCOUNTER — PATIENT MESSAGE (OUTPATIENT)
Dept: GASTROENTEROLOGY | Facility: CLINIC | Age: 33
End: 2023-03-13
Payer: MEDICAID

## 2023-03-24 ENCOUNTER — TELEPHONE (OUTPATIENT)
Dept: GASTROENTEROLOGY | Facility: CLINIC | Age: 33
End: 2023-03-24
Payer: MEDICAID

## 2023-03-27 ENCOUNTER — PATIENT MESSAGE (OUTPATIENT)
Dept: GASTROENTEROLOGY | Facility: CLINIC | Age: 33
End: 2023-03-27
Payer: MEDICAID

## 2023-06-01 ENCOUNTER — OFFICE VISIT (OUTPATIENT)
Dept: GASTROENTEROLOGY | Facility: CLINIC | Age: 33
End: 2023-06-01
Payer: MEDICAID

## 2023-06-01 VITALS
BODY MASS INDEX: 27.23 KG/M2 | HEIGHT: 66 IN | SYSTOLIC BLOOD PRESSURE: 148 MMHG | WEIGHT: 169.44 LBS | DIASTOLIC BLOOD PRESSURE: 90 MMHG

## 2023-06-01 DIAGNOSIS — R14.0 BLOATING: ICD-10-CM

## 2023-06-01 DIAGNOSIS — K59.00 CONSTIPATION, UNSPECIFIED CONSTIPATION TYPE: Primary | ICD-10-CM

## 2023-06-01 DIAGNOSIS — K58.1 IRRITABLE BOWEL SYNDROME WITH CONSTIPATION: ICD-10-CM

## 2023-06-01 PROCEDURE — 3008F BODY MASS INDEX DOCD: CPT | Mod: CPTII,,, | Performed by: PHYSICIAN ASSISTANT

## 2023-06-01 PROCEDURE — 1159F MED LIST DOCD IN RCRD: CPT | Mod: CPTII,,, | Performed by: PHYSICIAN ASSISTANT

## 2023-06-01 PROCEDURE — 99999 PR PBB SHADOW E&M-EST. PATIENT-LVL III: ICD-10-PCS | Mod: PBBFAC,,, | Performed by: PHYSICIAN ASSISTANT

## 2023-06-01 PROCEDURE — 4010F ACE/ARB THERAPY RXD/TAKEN: CPT | Mod: CPTII,,, | Performed by: PHYSICIAN ASSISTANT

## 2023-06-01 PROCEDURE — 3080F PR MOST RECENT DIASTOLIC BLOOD PRESSURE >= 90 MM HG: ICD-10-PCS | Mod: CPTII,,, | Performed by: PHYSICIAN ASSISTANT

## 2023-06-01 PROCEDURE — 4010F PR ACE/ARB THEARPY RXD/TAKEN: ICD-10-PCS | Mod: CPTII,,, | Performed by: PHYSICIAN ASSISTANT

## 2023-06-01 PROCEDURE — 1159F PR MEDICATION LIST DOCUMENTED IN MEDICAL RECORD: ICD-10-PCS | Mod: CPTII,,, | Performed by: PHYSICIAN ASSISTANT

## 2023-06-01 PROCEDURE — 99213 OFFICE O/P EST LOW 20 MIN: CPT | Mod: PBBFAC | Performed by: PHYSICIAN ASSISTANT

## 2023-06-01 PROCEDURE — 99214 OFFICE O/P EST MOD 30 MIN: CPT | Mod: S$PBB,,, | Performed by: PHYSICIAN ASSISTANT

## 2023-06-01 PROCEDURE — 99214 PR OFFICE/OUTPT VISIT, EST, LEVL IV, 30-39 MIN: ICD-10-PCS | Mod: S$PBB,,, | Performed by: PHYSICIAN ASSISTANT

## 2023-06-01 PROCEDURE — 3008F PR BODY MASS INDEX (BMI) DOCUMENTED: ICD-10-PCS | Mod: CPTII,,, | Performed by: PHYSICIAN ASSISTANT

## 2023-06-01 PROCEDURE — 3077F PR MOST RECENT SYSTOLIC BLOOD PRESSURE >= 140 MM HG: ICD-10-PCS | Mod: CPTII,,, | Performed by: PHYSICIAN ASSISTANT

## 2023-06-01 PROCEDURE — 99999 PR PBB SHADOW E&M-EST. PATIENT-LVL III: CPT | Mod: PBBFAC,,, | Performed by: PHYSICIAN ASSISTANT

## 2023-06-01 PROCEDURE — 3077F SYST BP >= 140 MM HG: CPT | Mod: CPTII,,, | Performed by: PHYSICIAN ASSISTANT

## 2023-06-01 PROCEDURE — 3080F DIAST BP >= 90 MM HG: CPT | Mod: CPTII,,, | Performed by: PHYSICIAN ASSISTANT

## 2023-06-01 RX ORDER — TENAPANOR HYDROCHLORIDE 53.2 MG/1
50 TABLET ORAL 2 TIMES DAILY
Qty: 60 TABLET | Refills: 2 | Status: SHIPPED | OUTPATIENT
Start: 2023-06-01 | End: 2024-01-25

## 2023-06-01 NOTE — PROGRESS NOTES
Subjective:      Patient ID: Yumiko Bautista is a 32 y.o. female.    Chief Complaint: Constipation and Gastroesophageal Reflux    HPI:  Patient reports to clinic today for follow up of the above. Last visit, she was started on a trial of Trulance, as she was not responding well to alternate prescriptive medications for her constipation. She reports Trulance is not helpful. She continues with abdominal discomfort that she believes is due to not being able to have a bowel movement. Last BM was about 3-4 days ago - had some rectal bleeding with passing a large stool. Is currently in pelvic floor therapy.   She was also started on trial of Protonix due to dysphagia. Reported her medications would come back up often. She reports that Protonix is not working. She states that the sensation is right at the back of her throat, similar symptom occurs if her toothbrush goes too far back on her tongue. Seems consistent with a gag reflex.     Review of Systems   Constitutional:  Negative for activity change, appetite change, chills, diaphoresis, fatigue, fever and unexpected weight change.   HENT:  Positive for sinus pressure and trouble swallowing.    Respiratory:  Negative for cough and shortness of breath.    Cardiovascular:  Negative for chest pain.   Gastrointestinal:  Positive for abdominal pain, anal bleeding (x1) and constipation. Negative for abdominal distention, blood in stool, diarrhea, nausea and vomiting.   Skin:  Negative for color change and pallor.   Neurological:  Negative for dizziness and weakness.   Psychiatric/Behavioral:  Negative for dysphoric mood. The patient is not nervous/anxious.      Medical History: Reviewed    Social History: Reviewed    Allergies: Reviewed    Objective:     Physical Exam  Constitutional:       General: She is not in acute distress.     Appearance: Normal appearance. She is not ill-appearing, toxic-appearing or diaphoretic.   HENT:      Head: Normocephalic and atraumatic.   Eyes:       General: No scleral icterus.     Extraocular Movements: Extraocular movements intact.   Cardiovascular:      Rate and Rhythm: Normal rate and regular rhythm.   Pulmonary:      Effort: Pulmonary effort is normal. No respiratory distress.   Abdominal:      General: There is no distension.      Palpations: Abdomen is soft.   Musculoskeletal:         General: Normal range of motion.      Cervical back: Normal range of motion.   Skin:     General: Skin is warm and dry.   Neurological:      Mental Status: She is alert and oriented to person, place, and time.       Assessment:     1. Constipation, unspecified constipation type    2. Bloating    3. Irritable bowel syndrome with constipation        Plan:     -Discussed options for constipation:   Other medications not working for her. Can try trial of Ibsrela vs sitz marker test with referral to colorectal. Patient would like to try Ibsrela first. Follow up 2-3 months. If she continues with symptoms, will need colorectal referral.   -Continue PFT  -Dysphagia seems to be more of a gag reflex, not as much of acid reflux. She believes it could be associated with her sinus issues. Recommend she follow up with ENT. Can always consider EGD if symptoms progress to actual dysphagia.       Yumiko was seen today for constipation and gastroesophageal reflux.    Diagnoses and all orders for this visit:    Constipation, unspecified constipation type  -     tenapanor (IBSRELA) 50 mg Tab; Take 50 mg by mouth 2 (two) times daily.    Bloating  -     tenapanor (IBSRELA) 50 mg Tab; Take 50 mg by mouth 2 (two) times daily.    Irritable bowel syndrome with constipation  -     tenapanor (IBSRELA) 50 mg Tab; Take 50 mg by mouth 2 (two) times daily.        No follow-ups on file.    Thank you for the opportunity to participate in the care of this patient.   Felisa Arevalo PA-C.

## 2023-06-05 ENCOUNTER — TELEPHONE (OUTPATIENT)
Dept: GASTROENTEROLOGY | Facility: CLINIC | Age: 33
End: 2023-06-05
Payer: MEDICAID

## 2023-06-05 NOTE — TELEPHONE ENCOUNTER
----- Message from Kym García sent at 6/5/2023  9:45 AM CDT -----  Contact: Ricarda Chowdary called regarding the pt medication tenapanor (IBSRELA) 50 mg Tab. The diagnosis code is wrong. Please call her back at [phone].  Case# [id number]    Thanks  TS

## 2023-06-05 NOTE — TELEPHONE ENCOUNTER
Returned call to Ricarda [phone].  Case# [id number] who stated if ISBRELA is prescribed, the dx code should always be K58.1

## 2023-08-11 ENCOUNTER — TELEPHONE (OUTPATIENT)
Dept: GASTROENTEROLOGY | Facility: CLINIC | Age: 33
End: 2023-08-11
Payer: MEDICAID

## 2024-07-20 ENCOUNTER — HOSPITAL ENCOUNTER (EMERGENCY)
Facility: HOSPITAL | Age: 34
Discharge: PSYCHIATRIC OR SUBSTANCE USE FACILITY | End: 2024-07-21
Attending: EMERGENCY MEDICINE
Payer: MEDICAID

## 2024-07-20 VITALS
HEART RATE: 84 BPM | SYSTOLIC BLOOD PRESSURE: 135 MMHG | WEIGHT: 176.13 LBS | RESPIRATION RATE: 14 BRPM | TEMPERATURE: 99 F | BODY MASS INDEX: 28.31 KG/M2 | OXYGEN SATURATION: 100 % | DIASTOLIC BLOOD PRESSURE: 95 MMHG | HEIGHT: 66 IN

## 2024-07-20 DIAGNOSIS — R45.851 DEPRESSION WITH SUICIDAL IDEATION: ICD-10-CM

## 2024-07-20 DIAGNOSIS — Z00.8 MEDICAL CLEARANCE FOR PSYCHIATRIC ADMISSION: Primary | ICD-10-CM

## 2024-07-20 DIAGNOSIS — F32.A DEPRESSION WITH SUICIDAL IDEATION: ICD-10-CM

## 2024-07-20 DIAGNOSIS — R45.89 SUICIDAL BEHAVIOR WITHOUT ATTEMPTED SELF-INJURY: ICD-10-CM

## 2024-07-20 LAB
ALBUMIN SERPL BCP-MCNC: 3.6 G/DL (ref 3.5–5.2)
ALP SERPL-CCNC: 66 U/L (ref 55–135)
ALT SERPL W/O P-5'-P-CCNC: 11 U/L (ref 10–44)
AMPHET+METHAMPHET UR QL: NEGATIVE
ANION GAP SERPL CALC-SCNC: 13 MMOL/L (ref 8–16)
APAP SERPL-MCNC: <3 UG/ML (ref 10–20)
AST SERPL-CCNC: 16 U/L (ref 10–40)
B-HCG UR QL: NEGATIVE
BARBITURATES UR QL SCN>200 NG/ML: NEGATIVE
BASOPHILS # BLD AUTO: 0.02 K/UL (ref 0–0.2)
BASOPHILS NFR BLD: 0.3 % (ref 0–1.9)
BENZODIAZ UR QL SCN>200 NG/ML: NEGATIVE
BILIRUB SERPL-MCNC: 0.3 MG/DL (ref 0.1–1)
BILIRUB UR QL STRIP: NEGATIVE
BUN SERPL-MCNC: 9 MG/DL (ref 6–20)
BZE UR QL SCN: NEGATIVE
CALCIUM SERPL-MCNC: 10.1 MG/DL (ref 8.7–10.5)
CANNABINOIDS UR QL SCN: NEGATIVE
CHLORIDE SERPL-SCNC: 105 MMOL/L (ref 95–110)
CLARITY UR: CLEAR
CO2 SERPL-SCNC: 20 MMOL/L (ref 23–29)
COLOR UR: YELLOW
CREAT SERPL-MCNC: 0.8 MG/DL (ref 0.5–1.4)
CREAT UR-MCNC: 115.2 MG/DL (ref 15–325)
DIFFERENTIAL METHOD BLD: ABNORMAL
EOSINOPHIL # BLD AUTO: 0.1 K/UL (ref 0–0.5)
EOSINOPHIL NFR BLD: 0.8 % (ref 0–8)
ERYTHROCYTE [DISTWIDTH] IN BLOOD BY AUTOMATED COUNT: 20 % (ref 11.5–14.5)
EST. GFR  (NO RACE VARIABLE): >60 ML/MIN/1.73 M^2
ETHANOL SERPL-MCNC: <10 MG/DL
GLUCOSE SERPL-MCNC: 89 MG/DL (ref 70–110)
GLUCOSE UR QL STRIP: NEGATIVE
HCT VFR BLD AUTO: 36.4 % (ref 37–48.5)
HCV AB SERPL QL IA: NEGATIVE
HEP C VIRUS HOLD SPECIMEN: NORMAL
HGB BLD-MCNC: 11.4 G/DL (ref 12–16)
HGB UR QL STRIP: ABNORMAL
HIV 1+2 AB+HIV1 P24 AG SERPL QL IA: NEGATIVE
IMM GRANULOCYTES # BLD AUTO: 0.02 K/UL (ref 0–0.04)
IMM GRANULOCYTES NFR BLD AUTO: 0.3 % (ref 0–0.5)
KETONES UR QL STRIP: NEGATIVE
LEUKOCYTE ESTERASE UR QL STRIP: ABNORMAL
LYMPHOCYTES # BLD AUTO: 1.6 K/UL (ref 1–4.8)
LYMPHOCYTES NFR BLD: 23.8 % (ref 18–48)
MCH RBC QN AUTO: 22 PG (ref 27–31)
MCHC RBC AUTO-ENTMCNC: 31.3 G/DL (ref 32–36)
MCV RBC AUTO: 70 FL (ref 82–98)
METHADONE UR QL SCN>300 NG/ML: NEGATIVE
MICROSCOPIC COMMENT: NORMAL
MONOCYTES # BLD AUTO: 0.5 K/UL (ref 0.3–1)
MONOCYTES NFR BLD: 8.1 % (ref 4–15)
NEUTROPHILS # BLD AUTO: 4.4 K/UL (ref 1.8–7.7)
NEUTROPHILS NFR BLD: 66.7 % (ref 38–73)
NITRITE UR QL STRIP: NEGATIVE
NRBC BLD-RTO: 0 /100 WBC
OPIATES UR QL SCN: NEGATIVE
PCP UR QL SCN>25 NG/ML: NEGATIVE
PH UR STRIP: 7 [PH] (ref 5–8)
PLATELET # BLD AUTO: 306 K/UL (ref 150–450)
PMV BLD AUTO: 10.2 FL (ref 9.2–12.9)
POTASSIUM SERPL-SCNC: 4.5 MMOL/L (ref 3.5–5.1)
PROT SERPL-MCNC: 7.8 G/DL (ref 6–8.4)
PROT UR QL STRIP: NEGATIVE
RBC # BLD AUTO: 5.18 M/UL (ref 4–5.4)
RBC #/AREA URNS HPF: 2 /HPF (ref 0–4)
SARS-COV-2 RDRP RESP QL NAA+PROBE: NEGATIVE
SODIUM SERPL-SCNC: 138 MMOL/L (ref 136–145)
SP GR UR STRIP: 1.02 (ref 1–1.03)
SQUAMOUS #/AREA URNS HPF: 6 /HPF
TOXICOLOGY INFORMATION: NORMAL
TSH SERPL DL<=0.005 MIU/L-ACNC: 1.47 UIU/ML (ref 0.4–4)
URN SPEC COLLECT METH UR: ABNORMAL
UROBILINOGEN UR STRIP-ACNC: NEGATIVE EU/DL
WBC # BLD AUTO: 6.55 K/UL (ref 3.9–12.7)
WBC #/AREA URNS HPF: 4 /HPF (ref 0–5)

## 2024-07-20 PROCEDURE — 80053 COMPREHEN METABOLIC PANEL: CPT | Performed by: FAMILY MEDICINE

## 2024-07-20 PROCEDURE — 80143 DRUG ASSAY ACETAMINOPHEN: CPT | Performed by: FAMILY MEDICINE

## 2024-07-20 PROCEDURE — 84443 ASSAY THYROID STIM HORMONE: CPT | Performed by: FAMILY MEDICINE

## 2024-07-20 PROCEDURE — 81000 URINALYSIS NONAUTO W/SCOPE: CPT | Performed by: FAMILY MEDICINE

## 2024-07-20 PROCEDURE — 81025 URINE PREGNANCY TEST: CPT | Performed by: FAMILY MEDICINE

## 2024-07-20 PROCEDURE — 82077 ASSAY SPEC XCP UR&BREATH IA: CPT | Performed by: FAMILY MEDICINE

## 2024-07-20 PROCEDURE — 85025 COMPLETE CBC W/AUTO DIFF WBC: CPT | Performed by: FAMILY MEDICINE

## 2024-07-20 PROCEDURE — 86803 HEPATITIS C AB TEST: CPT | Performed by: EMERGENCY MEDICINE

## 2024-07-20 PROCEDURE — 80307 DRUG TEST PRSMV CHEM ANLYZR: CPT | Performed by: FAMILY MEDICINE

## 2024-07-20 PROCEDURE — 99285 EMERGENCY DEPT VISIT HI MDM: CPT

## 2024-07-20 PROCEDURE — U0002 COVID-19 LAB TEST NON-CDC: HCPCS | Performed by: FAMILY MEDICINE

## 2024-07-20 PROCEDURE — 87389 HIV-1 AG W/HIV-1&-2 AB AG IA: CPT | Performed by: EMERGENCY MEDICINE

## 2024-07-20 RX ORDER — HALOPERIDOL 5 MG/1
5 TABLET ORAL
Status: DISCONTINUED | OUTPATIENT
Start: 2024-07-20 | End: 2024-07-21 | Stop reason: HOSPADM

## 2024-07-20 RX ORDER — SERTRALINE HYDROCHLORIDE 50 MG/1
TABLET, FILM COATED ORAL
Status: ON HOLD | COMMUNITY
Start: 2024-07-16 | End: 2024-07-23 | Stop reason: HOSPADM

## 2024-07-20 RX ORDER — LISINOPRIL 10 MG/1
TABLET ORAL
COMMUNITY
Start: 2024-07-17

## 2024-07-21 ENCOUNTER — HOSPITAL ENCOUNTER (INPATIENT)
Facility: HOSPITAL | Age: 34
LOS: 2 days | Discharge: HOME | DRG: 885 | End: 2024-07-23
Attending: STUDENT IN AN ORGANIZED HEALTH CARE EDUCATION/TRAINING PROGRAM | Admitting: STUDENT IN AN ORGANIZED HEALTH CARE EDUCATION/TRAINING PROGRAM
Payer: MEDICAID

## 2024-07-21 DIAGNOSIS — R45.851 DEPRESSION WITH SUICIDAL IDEATION: Primary | ICD-10-CM

## 2024-07-21 DIAGNOSIS — F32.A DEPRESSION WITH SUICIDAL IDEATION: Primary | ICD-10-CM

## 2024-07-21 PROBLEM — I10 ESSENTIAL HYPERTENSION: Status: ACTIVE | Noted: 2024-07-21

## 2024-07-21 PROCEDURE — 99223 1ST HOSP IP/OBS HIGH 75: CPT | Mod: AF,HB,, | Performed by: STUDENT IN AN ORGANIZED HEALTH CARE EDUCATION/TRAINING PROGRAM

## 2024-07-21 PROCEDURE — 25000242 PHARM REV CODE 250 ALT 637 W/ HCPCS: Performed by: STUDENT IN AN ORGANIZED HEALTH CARE EDUCATION/TRAINING PROGRAM

## 2024-07-21 PROCEDURE — 25000003 PHARM REV CODE 250: Performed by: STUDENT IN AN ORGANIZED HEALTH CARE EDUCATION/TRAINING PROGRAM

## 2024-07-21 PROCEDURE — 90833 PSYTX W PT W E/M 30 MIN: CPT | Mod: AF,HB,, | Performed by: STUDENT IN AN ORGANIZED HEALTH CARE EDUCATION/TRAINING PROGRAM

## 2024-07-21 PROCEDURE — 99231 SBSQ HOSP IP/OBS SF/LOW 25: CPT | Mod: ,,, | Performed by: STUDENT IN AN ORGANIZED HEALTH CARE EDUCATION/TRAINING PROGRAM

## 2024-07-21 PROCEDURE — 11400000 HC PSYCH PRIVATE ROOM

## 2024-07-21 RX ORDER — IBUPROFEN 200 MG
1 TABLET ORAL DAILY
Status: DISCONTINUED | OUTPATIENT
Start: 2024-07-21 | End: 2024-07-23 | Stop reason: HOSPADM

## 2024-07-21 RX ORDER — GUAIFENESIN 600 MG/1
600 TABLET, EXTENDED RELEASE ORAL 2 TIMES DAILY
Status: DISCONTINUED | OUTPATIENT
Start: 2024-07-21 | End: 2024-07-23 | Stop reason: HOSPADM

## 2024-07-21 RX ORDER — LISINOPRIL 10 MG/1
10 TABLET ORAL DAILY
Status: DISCONTINUED | OUTPATIENT
Start: 2024-07-21 | End: 2024-07-23 | Stop reason: HOSPADM

## 2024-07-21 RX ORDER — AMLODIPINE BESYLATE 5 MG/1
5 TABLET ORAL DAILY
Status: DISCONTINUED | OUTPATIENT
Start: 2024-07-21 | End: 2024-07-23 | Stop reason: HOSPADM

## 2024-07-21 RX ORDER — LAMOTRIGINE 100 MG/1
100 TABLET ORAL NIGHTLY
Status: DISCONTINUED | OUTPATIENT
Start: 2024-07-21 | End: 2024-07-23 | Stop reason: HOSPADM

## 2024-07-21 RX ORDER — HYDROXYZINE PAMOATE 50 MG/1
50 CAPSULE ORAL EVERY 6 HOURS PRN
Status: DISCONTINUED | OUTPATIENT
Start: 2024-07-21 | End: 2024-07-23 | Stop reason: HOSPADM

## 2024-07-21 RX ORDER — LISINOPRIL 10 MG/1
10 TABLET ORAL DAILY
Status: DISCONTINUED | OUTPATIENT
Start: 2024-07-21 | End: 2024-07-21

## 2024-07-21 RX ORDER — ACETAMINOPHEN 325 MG/1
650 TABLET ORAL EVERY 6 HOURS PRN
Status: DISCONTINUED | OUTPATIENT
Start: 2024-07-21 | End: 2024-07-23 | Stop reason: HOSPADM

## 2024-07-21 RX ORDER — SERTRALINE HYDROCHLORIDE 100 MG/1
100 TABLET, FILM COATED ORAL DAILY
Status: DISCONTINUED | OUTPATIENT
Start: 2024-07-21 | End: 2024-07-23 | Stop reason: HOSPADM

## 2024-07-21 RX ORDER — OLANZAPINE 10 MG/2ML
10 INJECTION, POWDER, FOR SOLUTION INTRAMUSCULAR EVERY 8 HOURS PRN
Status: DISCONTINUED | OUTPATIENT
Start: 2024-07-21 | End: 2024-07-23 | Stop reason: HOSPADM

## 2024-07-21 RX ORDER — OLANZAPINE 10 MG/1
10 TABLET ORAL EVERY 8 HOURS PRN
Status: DISCONTINUED | OUTPATIENT
Start: 2024-07-21 | End: 2024-07-23 | Stop reason: HOSPADM

## 2024-07-21 RX ORDER — FLUTICASONE PROPIONATE 50 MCG
1 SPRAY, SUSPENSION (ML) NASAL DAILY
Status: DISCONTINUED | OUTPATIENT
Start: 2024-07-21 | End: 2024-07-23 | Stop reason: HOSPADM

## 2024-07-21 RX ADMIN — ACETAMINOPHEN 650 MG: 325 TABLET ORAL at 09:07

## 2024-07-21 RX ADMIN — AMLODIPINE BESYLATE 5 MG: 5 TABLET ORAL at 12:07

## 2024-07-21 RX ADMIN — FLUTICASONE PROPIONATE 50 MCG: 50 SPRAY, METERED NASAL at 02:07

## 2024-07-21 RX ADMIN — LAMOTRIGINE 100 MG: 100 TABLET ORAL at 08:07

## 2024-07-21 RX ADMIN — LISINOPRIL 10 MG: 10 TABLET ORAL at 12:07

## 2024-07-21 RX ADMIN — GUAIFENESIN 600 MG: 600 TABLET, EXTENDED RELEASE ORAL at 08:07

## 2024-07-21 RX ADMIN — ACETAMINOPHEN 650 MG: 325 TABLET ORAL at 08:07

## 2024-07-21 RX ADMIN — SERTRALINE 100 MG: 100 TABLET, FILM COATED ORAL at 12:07

## 2024-07-21 NOTE — NURSING
New admit.Pt sleeping at this time, slept 1 hrs with no awakenings. NAD. Resp even and unlabored.Pathways clear,bed in low position. Q 15 min safety check ongoing.All precautions maintained.
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
## 2024-07-21 NOTE — ED PROVIDER NOTES
"SCRIBE #1 NOTE: I, Elías Tatum, am scribing for, and in the presence of, Lalo Yeboah Jr., MD. I have scribed the entire note.       History     Chief Complaint   Patient presents with    Suicidal     Pt reports she is having some suicidal thoughts/ Pt states " I feel like I am having a nervous breakdown. And just want to jump off a bridge". Pt denies ever feeling like this before. +SI/-HI/-A/-V hallucinations. Pt reports some stress at home but did not go into detail     Review of patient's allergies indicates:  No Known Allergies      History of Present Illness     HPI    7/20/2024, 9:35 PM  History obtained from the patient      History of Present Illness: Yumiko Bautista is a 33 y.o. female patient with a PMHx of HTN, anemia, anxiety, depression, bi-polar disorder, and vaginismus who presents to the Emergency Department for evaluation of SI which onset gradually PTA. Pt states she had a "really bad day" and that she was reminded of her past triggers. Pt says she has thought about killing herself by jumping off a bridge. PT was scared because she has never had these thoughts before. Pt states she sees a psychiatrist and is compliant with her psychiatric medication. Symptoms are constant and moderate in severity. Pt denies taking other pills, drinking today, or illicit drugs. No mitigating or exacerbating factors reported. Patient denies any auditory or visual hallucinations, HI, and all other sxs at this time. No prior Tx reported. No further complaints or concerns at this time.       Arrival mode: Personal vehicle     PCP: No, Primary Doctor        Past Medical History:  Past Medical History:   Diagnosis Date    Anemia     Hypertension     Mental disorder     Bi polar    Vaginismus     treated w/ physical therapy       Past Surgical History:  Past Surgical History:   Procedure Laterality Date    COLONOSCOPY N/A 08/29/2022    Procedure: COLONOSCOPY;  Surgeon: Mirna Alan MD;  Location: [facility];  " Service: Endoscopy;  Laterality: N/A;    cyst removal      labial     HYSTEROSCOPY W/ POLYPECTOMY  02/01/2024    [facility] [place] [facility]C POLYPECTOMY for menorrhagia    REDUCTION OF BOTH BREASTS  06/10/2022    SKIN BIOPSY Left     pre-cancerous cells on left foot removed         Family History:  Family History   Problem Relation Name Age of Onset    Hypertension Paternal Grandfather      Hypertension Paternal Grandmother      Cervical cancer Paternal Grandmother      Hypertension Maternal Grandmother      Hypertension Maternal Grandfather      Hypertension Mother      Breast cancer Other          mat great GM    Colon cancer Neg Hx      Thrombosis Neg Hx         Social History:  Social History     Tobacco Use    Smoking status: Never    Smokeless tobacco: Never   Substance and Sexual Activity    Alcohol use: Yes     Comment: socially    Drug use: No    Sexual activity: Not Currently     Partners: Male        Review of Systems     Review of Systems   Constitutional:  Negative for fever.   HENT:  Negative for sore throat.    Respiratory:  Negative for shortness of breath.    Cardiovascular:  Negative for chest pain.   Gastrointestinal:  Negative for nausea.   Genitourinary:  Negative for dysuria.   Musculoskeletal:  Negative for back pain.   Skin:  Negative for rash.   Neurological:  Negative for weakness.   Hematological:  Does not bruise/bleed easily.   Psychiatric/Behavioral:  Positive for suicidal ideas (with plan). Negative for hallucinations (auditory and visual).         (-) HI   All other systems reviewed and are negative.       Physical Exam     Initial Vitals [07/20/24 1915]   BP Pulse Resp Temp SpO2   (!) 146/97 86 (!) 22 98.7 °F (37.1 °C) 100 %      MAP       --          Physical Exam  Nursing Notes and Vital Signs Reviewed.  Constitutional: Patient is in mild distress. Well-developed and well-nourished.  Head: Atraumatic. Normocephalic.  Eyes: PERRL. EOM intact. Conjunctivae are not pale. No scleral icterus.  ENT: Mucous  membranes are moist. Oropharynx is clear and symmetric.    Neck: Supple. Full ROM. No lymphadenopathy.  Cardiovascular: Regular rate. Regular rhythm. No murmurs, rubs, or gallops. Distal pulses are 2+ and symmetric.  Pulmonary/Chest: No respiratory distress. Clear to auscultation bilaterally. No wheezing or rales.  Abdominal: Soft and non-distended.  There is no tenderness.  No rebound, guarding, or rigidity. Good bowel sounds.  Genitourinary: No CVA tenderness  Musculoskeletal: Moves all extremities. No obvious deformities. No edema. No calf tenderness.  Skin: Warm and dry.  Neurological:  Alert, awake, and appropriate.  Normal speech.  No acute focal neurological deficits are appreciated.  Psychiatric:Awake, alert, and appropriate x3. Mood: depressed, SI with plan, no HI. Thought content: blocked. Affect is flat. No hallucinations or delusions.     ED Course   Critical Care    Date/Time: 7/20/2024 9:53 PM    Performed by: Lalo Yeboah Jr., MD  Authorized by: Lalo Yeboah Jr., MD  Direct patient critical care time: 14 minutes  Additional history critical care time: 10 minutes  Ordering / reviewing critical care time: 14 minutes  Documentation critical care time: 9 minutes  Consulting other physicians critical care time: 11 minutes  Consult with family critical care time: 9 minutes  Other critical care time: 8 minutes  Total critical care time (exclusive of procedural time) : 75 minutes  Critical care time was exclusive of teaching time and separately billable procedures and treating other patients.  Critical care was necessary to treat or prevent imminent or life-threatening deterioration of the following conditions: acute psychosis, SI.  Critical care was time spent personally by me on the following activities: blood draw for specimens, development of treatment plan with patient or surrogate, discussions with consultants, interpretation of cardiac output measurements, evaluation of patient's response to  "treatment, examination of patient, ordering and performing treatments and interventions, obtaining history from patient or surrogate, ordering and review of laboratory studies, ordering and review of radiographic studies, pulse oximetry, re-evaluation of patient's condition and review of old charts.        ED Vital Signs:  Vitals:    07/20/24 1915 07/20/24 2156   BP: (!) 146/97 (!) 135/95   Pulse: 86 84   Resp: (!) 22 14   Temp: 98.7 °F (37.1 °C)    TempSrc: Oral    SpO2: 100% 100%   Weight: 79.9 kg (176 lb 2.4 oz)    Height: 5' 6" (1.676 m)        Abnormal Lab Results:  Labs Reviewed   CBC W/ AUTO DIFFERENTIAL - Abnormal       Result Value    WBC 6.55      RBC 5.18      Hemoglobin 11.4 (*)     Hematocrit 36.4 (*)     MCV 70 (*)     MCH 22.0 (*)     MCHC 31.3 (*)     RDW 20.0 (*)     Platelets 306      MPV 10.2      Immature Granulocytes 0.3      Gran # (ANC) 4.4      Immature Grans (Abs) 0.02      Lymph # 1.6      Mono # 0.5      Eos # 0.1      Baso # 0.02      nRBC 0      Gran % 66.7      Lymph % 23.8      Mono % 8.1      Eosinophil % 0.8      Basophil % 0.3      Differential Method Automated      Narrative:     Release to patient->Immediate   COMPREHENSIVE METABOLIC PANEL - Abnormal    Sodium 138      Potassium 4.5      Chloride 105      CO2 20 (*)     Glucose 89      BUN 9      Creatinine 0.8      Calcium 10.1      Total Protein 7.8      Albumin 3.6      Total Bilirubin 0.3      Alkaline Phosphatase 66      AST 16      ALT 11      eGFR >60      Anion Gap 13      Narrative:     Release to patient->Immediate   URINALYSIS, REFLEX TO URINE CULTURE - Abnormal    Specimen UA Urine, Clean Catch      Color, UA Yellow      Appearance, UA Clear      pH, UA 7.0      Specific Gravity, UA 1.020      Protein, UA Negative      Glucose, UA Negative      Ketones, UA Negative      Bilirubin (UA) Negative      Occult Blood UA 1+ (*)     Nitrite, UA Negative      Urobilinogen, UA Negative      Leukocytes, UA Trace (*)     " Narrative:     Specimen Source->Urine   ACETAMINOPHEN LEVEL - Abnormal    Acetaminophen (Tylenol), Serum <3.0 (*)     Narrative:     Release to patient->Immediate   HIV 1 / 2 ANTIBODY    HIV 1/2 Ag/Ab Negative      Narrative:     Release to patient->Immediate   HEPATITIS C ANTIBODY    Hepatitis C Ab Negative      Narrative:     Release to patient->Immediate   HEP C VIRUS HOLD SPECIMEN    HEP C Virus Hold Specimen Hold for HCV sendout      Narrative:     Release to patient->Immediate   TSH    TSH 1.466      Narrative:     Release to patient->Immediate   DRUG SCREEN PANEL, URINE EMERGENCY    Benzodiazepines Negative      Methadone metabolites Negative      Cocaine (Metab.) Negative      Opiate Scrn, Ur Negative      Barbiturate Screen, Ur Negative      Amphetamine Screen, Ur Negative      THC Negative      Phencyclidine Negative      Creatinine, Urine 115.2      Toxicology Information SEE COMMENT      Narrative:     Specimen Source->Urine   ALCOHOL,MEDICAL (ETHANOL)    Alcohol, Serum <10      Narrative:     Release to patient->Immediate   SARS-COV-2 RNA AMPLIFICATION, QUAL    SARS-CoV-2 RNA, Amplification, Qual Negative     URINALYSIS MICROSCOPIC    RBC, UA 2      WBC, UA 4      Squam Epithel, UA 6      Microscopic Comment SEE COMMENT      Narrative:     Specimen Source->Urine   PREGNANCY TEST, URINE RAPID   PREGNANCY TEST, URINE RAPID    Preg Test, Ur Negative      Narrative:     Specimen Source->Urine        All Lab Results:  Results for orders placed or performed during the hospital encounter of 07/20/24   HIV 1/2 Ag/Ab (4th Gen)   Result Value Ref Range    HIV 1/2 Ag/Ab Negative Negative   Hepatitis C Antibody   Result Value Ref Range    Hepatitis C Ab Negative Negative   HCV Virus Hold Specimen   Result Value Ref Range    HEP C Virus Hold Specimen Hold for HCV sendout    CBC auto differential   Result Value Ref Range    WBC 6.55 3.90 - 12.70 K/uL    RBC 5.18 4.00 - 5.40 M/uL    Hemoglobin 11.4 (L) 12.0 - 16.0  g/dL    Hematocrit 36.4 (L) 37.0 - 48.5 %    MCV 70 (L) 82 - 98 fL    MCH 22.0 (L) 27.0 - 31.0 pg    MCHC 31.3 (L) 32.0 - 36.0 g/dL    RDW 20.0 (H) 11.5 - 14.5 %    Platelets 306 150 - 450 K/uL    MPV 10.2 9.2 - 12.9 fL    Immature Granulocytes 0.3 0.0 - 0.5 %    Gran # (ANC) 4.4 1.8 - 7.7 K/uL    Immature Grans (Abs) 0.02 0.00 - 0.04 K/uL    Lymph # 1.6 1.0 - 4.8 K/uL    Mono # 0.5 0.3 - 1.0 K/uL    Eos # 0.1 0.0 - 0.5 K/uL    Baso # 0.02 0.00 - 0.20 K/uL    nRBC 0 0 /100 WBC    Gran % 66.7 38.0 - 73.0 %    Lymph % 23.8 18.0 - 48.0 %    Mono % 8.1 4.0 - 15.0 %    Eosinophil % 0.8 0.0 - 8.0 %    Basophil % 0.3 0.0 - 1.9 %    Differential Method Automated    Comprehensive metabolic panel   Result Value Ref Range    Sodium 138 136 - 145 mmol/L    Potassium 4.5 3.5 - 5.1 mmol/L    Chloride 105 95 - 110 mmol/L    CO2 20 (L) 23 - 29 mmol/L    Glucose 89 70 - 110 mg/dL    BUN 9 6 - 20 mg/dL    Creatinine 0.8 0.5 - 1.4 mg/dL    Calcium 10.1 8.7 - 10.5 mg/dL    Total Protein 7.8 6.0 - 8.4 g/dL    Albumin 3.6 3.5 - 5.2 g/dL    Total Bilirubin 0.3 0.1 - 1.0 mg/dL    Alkaline Phosphatase 66 55 - 135 U/L    AST 16 10 - 40 U/L    ALT 11 10 - 44 U/L    eGFR >60 >60 mL/min/1.73 m^2    Anion Gap 13 8 - 16 mmol/L   TSH   Result Value Ref Range    TSH 1.466 0.400 - 4.000 uIU/mL   Urinalysis, Reflex to Urine Culture Urine, Clean Catch    Specimen: Urine   Result Value Ref Range    Specimen UA Urine, Clean Catch     Color, UA Yellow Yellow, Straw, Marycarmen    Appearance, UA Clear Clear    pH, UA 7.0 5.0 - 8.0    Specific Gravity, UA 1.020 1.005 - 1.030    Protein, UA Negative Negative    Glucose, UA Negative Negative    Ketones, UA Negative Negative    Bilirubin (UA) Negative Negative    Occult Blood UA 1+ (A) Negative    Nitrite, UA Negative Negative    Urobilinogen, UA Negative <2.0 EU/dL    Leukocytes, UA Trace (A) Negative   Drug screen panel, emergency   Result Value Ref Range    Benzodiazepines Negative Negative    Methadone  metabolites Negative Negative    Cocaine (Metab.) Negative Negative    Opiate Scrn, Ur Negative Negative    Barbiturate Screen, Ur Negative Negative    Amphetamine Screen, Ur Negative Negative    THC Negative Negative    Phencyclidine Negative Negative    Creatinine, Urine 115.2 15.0 - 325.0 mg/dL    Toxicology Information SEE COMMENT    Ethanol   Result Value Ref Range    Alcohol, Serum <10 <10 mg/dL   Acetaminophen level   Result Value Ref Range    Acetaminophen (Tylenol), Serum <3.0 (L) 10.0 - 20.0 ug/mL   COVID-19 Rapid Screening   Result Value Ref Range    SARS-CoV-2 RNA, Amplification, Qual Negative Negative   Urinalysis Microscopic   Result Value Ref Range    RBC, UA 2 0 - 4 /hpf    WBC, UA 4 0 - 5 /hpf    Squam Epithel, UA 6 /hpf    Microscopic Comment SEE COMMENT    Pregnancy, urine rapid   Result Value Ref Range    Preg Test, Ur Negative         Imaging Results:  Imaging Results    None                 The Emergency Provider reviewed the vital signs and test results, which are outlined above.     ED Discussion     9:45 PM: Pt has been medically cleared by Dr. Yeboah at this time. Reassessed pt at this time. Pt is resting comfortably and appears in no acute distress. There are no psychiatric services offered at this facility. D/w pt all pertinent ED information and plan to transfer to psychiatric facility for psychiatric treatment. Pt verbalizes understanding. [facility] en route. Pt has been made aware of all risks and benefits associated with transfer, including but not limited to death, MVC, loss of vital signs, and/or permanent disability. Benefits include ability to be treated at an inpatient psychiatric facility. Pt will be transported by personnel trained in CPR and CPI. Patient understands that there could be unforeseen motor vehicle accidents, inclement weather, or loss of vital signs that could result in potential death or permanent  disability. All questions and complaints have been addressed at this time. Pt condition is stable at this time and is clear to transfer to psychiatric facility at this time.      9:48 PM: Re-evaluated pt. Informed pt and family that there are no psychiatric services available at this time. I have discussed test results, shared treatment plan, and the need for transfer with patient and family at bedside. All historical, clinical, radiographic, and laboratory findings were reviewed with the patient/family in detail. Patient will be transferred by [facility] services with  care required en route. Patient understands that there could be unforeseen motor vehicle accidents or loss of vital signs that could result in potential death or permanent disability. Pt and family express understanding at this time and agree with all information. All questions answered. Pt and family have no further questions or concerns at this time. Pt is ready for transfer.       Medical Decision Making  Amount and/or Complexity of Data Reviewed  Labs: ordered. Decision-making details documented in ED Course.    Risk  OTC drugs.  Prescription drug management.  Parenteral controlled substances.  Risk Details: OTC drugs, prescription drugs and controlled substances considered.  Due to patient's symptoms improving and pain controlled pain medications ordered appropriately.  Differential diagnoses:  CVA, infection, pneumonia, urinary tract infection, intra-abdominal pathology, renal failure, dehydration, electrolyte abnormality or metabolic cause y, drug affect, hyperglycemia hypoglycemia, drug drug interaction, psychiatric illness, meningitis encephalitis, seizure, vasculitis, heart failure, arrhythmia, endocarditis      Critical Care  Total time providing critical care: 75 minutes                ED Medication(s):  Medications   haloperidoL tablet 5 mg (5 mg Oral Not Given 7/20/24 2200)       New Prescriptions    No medications on file                Scribe Attestation:   Scribe #1: I performed the above scribed service and the documentation accurately describes the services I performed. I attest to the accuracy of the note.     Attending:   Physician Attestation Statement for Scribe #1: I, Lalo Yeboah Jr., MD, personally performed the services described in this documentation, as scribed by Elías Tatum, in my presence, and it is both accurate and complete.           Clinical Impression       ICD-10-CM ICD-9-CM   1. Medical clearance for psychiatric admission  Z00.8 V70.8   2. Depression with suicidal ideation  F32.A 311    R45.851 V62.84   3. Suicidal behavior without attempted self-injury  R45.89 V62.89       Disposition:   Disposition: Transferred  Condition: Stable        Lalo Yeboah Jr., MD  07/20/24 4533

## 2024-07-21 NOTE — ED NOTES
Spoke to patient's sister on the phone to update regarding pt status per patient request. Pt's sister contact info: [phone] Tasia

## 2024-07-21 NOTE — ED PROVIDER NOTES
"Encounter Date: 7/20/2024       History     Chief Complaint   Patient presents with    Suicidal     Pt reports she is having some suicidal thoughts/ Pt states " I feel like I am having a nervous breakdown. And just want to jump off a bridge". Pt denies ever feeling like this before. +SI/-HI/-A/-V hallucinations. Pt reports some stress at home but did not go into detail     HPI  Review of patient's allergies indicates:  No Known Allergies  Past Medical History:   Diagnosis Date    Anemia     Hypertension     Mental disorder     Bi polar    Vaginismus     treated w/ physical therapy     Past Surgical History:   Procedure Laterality Date    COLONOSCOPY N/A 08/29/2022    Procedure: COLONOSCOPY;  Surgeon: Mirna Alan MD;  Location: [facility];  Service: Endoscopy;  Laterality: N/A;    cyst removal      labial     [place]    [facility] [place]    REDUCTION OF BOTH BREASTS  06/10/2022    SKIN BIOPSY Left     pre-cancerous cells on left foot removed     Family History   Problem Relation Name Age of Onset    Hypertension Paternal Grandfather      Hypertension Paternal Grandmother      Cervical cancer Paternal Grandmother      Hypertension Maternal Grandmother      Hypertension Maternal Grandfather      Hypertension Mother      Breast cancer Other          mat great GM    Colon cancer Neg Hx      Thrombosis Neg Hx       Social History     Tobacco Use    Smoking status: Never    Smokeless tobacco: Never   Substance Use Topics    Alcohol use: Yes     Comment: socially    Drug use: No     Review of Systems    Physical Exam     Initial Vitals [07/20/24 1915]   BP Pulse Resp Temp SpO2   (!) 146/97 86 (!) 22 98.7 °F (37.1 °C) 100 %      MAP       --         Physical Exam    ED Course   Procedures  Labs Reviewed   CBC W/ AUTO DIFFERENTIAL - Abnormal       Result Value    WBC 6.55      RBC 5.18      Hemoglobin 11.4 (*)     Hematocrit 36.4 (*)     MCV 70 (*)     MCH 22.0 (*)     " MCHC 31.3 (*)     RDW 20.0 (*)     Platelets 306      MPV 10.2      Immature Granulocytes 0.3      Gran # (ANC) 4.4      Immature Grans (Abs) 0.02      Lymph # 1.6      Mono # 0.5      Eos # 0.1      Baso # 0.02      nRBC 0      Gran % 66.7      Lymph % 23.8      Mono % 8.1      Eosinophil % 0.8      Basophil % 0.3      Differential Method Automated      Narrative:     Release to patient->Immediate   COMPREHENSIVE METABOLIC PANEL - Abnormal    Sodium 138      Potassium 4.5      Chloride 105      CO2 20 (*)     Glucose 89      BUN 9      Creatinine 0.8      Calcium 10.1      Total Protein 7.8      Albumin 3.6      Total Bilirubin 0.3      Alkaline Phosphatase 66      AST 16      ALT 11      eGFR >60      Anion Gap 13      Narrative:     Release to patient->Immediate   URINALYSIS, REFLEX TO URINE CULTURE - Abnormal    Specimen UA Urine, Clean Catch      Color, UA Yellow      Appearance, UA Clear      pH, UA 7.0      Specific Gravity, UA 1.020      Protein, UA Negative      Glucose, UA Negative      Ketones, UA Negative      Bilirubin (UA) Negative      Occult Blood UA 1+ (*)     Nitrite, UA Negative      Urobilinogen, UA Negative      Leukocytes, UA Trace (*)     Narrative:     Specimen Source->Urine   ACETAMINOPHEN LEVEL - Abnormal    Acetaminophen (Tylenol), Serum <3.0 (*)     Narrative:     Release to patient->Immediate   HIV 1 / 2 ANTIBODY    HIV 1/2 Ag/Ab Negative      Narrative:     Release to patient->Immediate   HEPATITIS C ANTIBODY    Hepatitis C Ab Negative      Narrative:     Release to patient->Immediate   HEP C VIRUS HOLD SPECIMEN    HEP C Virus Hold Specimen Hold for HCV sendout      Narrative:     Release to patient->Immediate   TSH    TSH 1.466      Narrative:     Release to patient->Immediate   DRUG SCREEN PANEL, URINE EMERGENCY    Benzodiazepines Negative      Methadone metabolites Negative      Cocaine (Metab.) Negative      Opiate Scrn, Ur Negative      Barbiturate Screen, Ur Negative       Amphetamine Screen, Ur Negative      THC Negative      Phencyclidine Negative      Creatinine, Urine 115.2      Toxicology Information SEE COMMENT      Narrative:     Specimen Source->Urine   ALCOHOL,MEDICAL (ETHANOL)    Alcohol, Serum <10      Narrative:     Release to patient->Immediate   SARS-COV-2 RNA AMPLIFICATION, QUAL    SARS-CoV-2 RNA, Amplification, Qual Negative     URINALYSIS MICROSCOPIC    RBC, UA 2      WBC, UA 4      Squam Epithel, UA 6      Microscopic Comment SEE COMMENT      Narrative:     Specimen Source->Urine   PREGNANCY TEST, URINE RAPID   PREGNANCY TEST, URINE RAPID    Preg Test, Ur Negative      Narrative:     Specimen Source->Urine          Imaging Results    None          Medications - No data to display  Medical Decision Making  Amount and/or Complexity of Data Reviewed  Labs: ordered.                                      Clinical Impression:  Final diagnoses:  [Z00.8] Medical clearance for psychiatric admission (Primary)  [F32.A, R45.851] Depression with suicidal ideation  [R45.89] Suicidal behavior without attempted self-injury          ED Disposition Condition    Transfer to Psych Facility Stable          ED Prescriptions    None       Follow-up Information    None          Tracy Gillespie MD  07/21/24 7579
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
## 2024-07-21 NOTE — PLAN OF CARE
Patient isolating in room with minimal interaction with peers. Patient very tearful stating she did not mean what she said to doctor. Patient states she feels numb inside. Depressed mood but would not rate depression. Patient minimizing and blaming mother for her being here. Patient denies SI/HI, no self harming behavior displayed, no aggressive behavior displayed towards others. Patient contracted for safety with staff and unit. Discussed healthy coping skills and techniques. Educated, reviewed and discussed plan of care and medication regiment with this patient 1:1. Patient voiced understanding of all teachings.

## 2024-07-21 NOTE — NURSING
Patient arrived to [facility] from [facility], escorted by [employer] [facility] and hospital security, via wheelchair, PEC with patient, scanned, no contraband found. Ambulated to assessment room without difficulty.

## 2024-07-21 NOTE — SUBJECTIVE & OBJECTIVE
Past Medical History:   Diagnosis Date    Anemia     Hypertension     Mental disorder     Bi polar    Vaginismus     treated w/ physical therapy       Past Surgical History:   Procedure Laterality Date    COLONOSCOPY N/A 08/29/2022    Procedure: COLONOSCOPY;  Surgeon: Mirna Alan MD;  Location: [facility];  Service: Endoscopy;  Laterality: N/A;    cyst removal      labial     HYSTEROSCOPY W/ POLYPECTOMY  02/01/2024    [facility], D&C POLYPECTOMY for menorrhagia    REDUCTION OF BOTH BREASTS  06/10/2022    SKIN BIOPSY Left     pre-cancerous cells on left foot removed       Review of patient's allergies indicates:  No Known Allergies    Current Facility-Administered Medications on File Prior to Encounter   Medication    [DISCONTINUED] haloperidoL tablet 5 mg     Current Outpatient Medications on File Prior to Encounter   Medication Sig    albuterol (PROVENTIL/VENTOLIN HFA) 90 mcg/actuation inhaler     amLODIPine (NORVASC) 5 MG tablet Take 5 mg by mouth.    fluticasone propionate (FLONASE) 50 mcg/actuation nasal spray 1 spray by Each Nostril route once daily.    gabapentin (NEURONTIN) 100 MG capsule TAKE 2 CAPSULES BY MOUTH TWICE DAILY AS NEEDED FOR ANXIETY    lamoTRIgine (LAMICTAL) 200 MG tablet Take 200 mg by mouth once daily.    lisinopriL 10 MG tablet     loratadine (CLARITIN) 10 mg tablet Take 10 mg by mouth.    norgestimate-ethinyl estradioL (SPRINTEC, 28,) 0.25-35 mg-mcg per tablet TAKE 1 TABLET BY MOUTH ONCE DAILY . APPOINTMENT REQUIRED FOR FUTURE REFILLS    sertraline (ZOLOFT) 50 MG tablet      Family History       Problem Relation (Age of Onset)    Breast cancer Other    Cervical cancer Paternal Grandmother    Hypertension Paternal Grandfather, Paternal Grandmother, Maternal Grandmother, Maternal Grandfather, Mother          Tobacco Use    Smoking status: Never    Smokeless tobacco: Never   Substance and Sexual Activity    Alcohol use: Yes     Comment: socially    Drug use: No    Sexual activity: Not Currently      Partners: Male     Review of Systems   Constitutional:  Negative for activity change, chills and fever.   HENT:  Positive for sinus pressure. Negative for congestion, rhinorrhea, sinus pain and sore throat.    Eyes:  Negative for pain and visual disturbance.   Respiratory:  Negative for cough and shortness of breath.    Cardiovascular:  Negative for chest pain and palpitations.   Gastrointestinal:  Negative for abdominal pain, diarrhea, nausea and vomiting.   Genitourinary:  Negative for difficulty urinating, dysuria and hematuria.   Musculoskeletal:  Negative for arthralgias and myalgias.   Neurological:  Negative for dizziness, weakness and light-headedness.   Psychiatric/Behavioral:  Negative for confusion and dysphoric mood.      Objective:     Vital Signs (Most Recent):  Temp: 97.4 °F (36.3 °C) (07/21/24 0718)  Pulse: 73 (07/21/24 0718)  Resp: 16 (07/21/24 0718)  BP: (!) 144/88 (07/21/24 0718)  SpO2: 99 % (07/21/24 0718) Vital Signs (24h Range):  Temp:  [97.3 °F (36.3 °C)-98.7 °F (37.1 °C)] 97.4 °F (36.3 °C)  Pulse:  [73-86] 73  Resp:  [14-22] 16  SpO2:  [99 %-100 %] 99 %  BP: (135-161)/(88-97) 144/88     Weight: 78 kg (172 lb 1.1 oz)  Body mass index is 28.2 kg/m².     Physical Exam  Vitals and nursing note reviewed.   Constitutional:       General: She is not in acute distress.  HENT:      Head: Normocephalic and atraumatic.      Mouth/Throat:      Mouth: Mucous membranes are moist.   Eyes:      Extraocular Movements: Extraocular movements intact.      Conjunctiva/sclera: Conjunctivae normal.      Pupils: Pupils are equal, round, and reactive to light.   Cardiovascular:      Rate and Rhythm: Normal rate and regular rhythm.      Heart sounds: Normal heart sounds. No murmur heard.  Pulmonary:      Effort: Pulmonary effort is normal. No respiratory distress.      Breath sounds: Normal breath sounds.   Abdominal:      General: Bowel sounds are normal. There is no distension.      Palpations: Abdomen is soft.  "     Tenderness: There is no abdominal tenderness.   Musculoskeletal:      Cervical back: Neck supple.   Neurological:      Mental Status: She is alert.      Cranial Nerves: Cranial nerves 2-12 are intact.          Significant Labs: All pertinent labs within the past 24 hours have been reviewed.  A1C: No results for input(s): "HGBA1C" in the last 4320 hours.  BMP:   Recent Labs   Lab 07/20/24 2025   GLU 89      K 4.5      CO2 20*   BUN 9   CREATININE 0.8   CALCIUM 10.1     CBC:   Recent Labs   Lab 07/20/24 2025   WBC 6.55   HGB 11.4*   HCT 36.4*        CMP:   Recent Labs   Lab 07/20/24 2025      K 4.5      CO2 20*   GLU 89   BUN 9   CREATININE 0.8   CALCIUM 10.1   PROT 7.8   ALBUMIN 3.6   BILITOT 0.3   ALKPHOS 66   AST 16   ALT 11   ANIONGAP 13     Cardiac Markers: No results for input(s): "CKMB", "MYOGLOBIN", "BNP", "TROPISTAT" in the last 48 hours.  Lipid Panel: No results for input(s): "CHOL", "HDL", "LDLCALC", "TRIG", "CHOLHDL" in the last 48 hours.  TSH:   Recent Labs   Lab 07/20/24 2025   TSH 1.466     Urine Studies:   Recent Labs   Lab 07/20/24 2005   COLORU Yellow   APPEARANCEUA Clear   PHUR 7.0   SPECGRAV 1.020   PROTEINUA Negative   GLUCUA Negative   KETONESU Negative   BILIRUBINUA Negative   OCCULTUA 1+*   NITRITE Negative   UROBILINOGEN Negative   LEUKOCYTESUR Trace*   RBCUA 2   WBCUA 4   SQUAMEPITHEL 6       Significant Imaging: I have reviewed all pertinent imaging results/findings within the past 24 hours.  "

## 2024-07-21 NOTE — ED NOTES
Patient departed with [facility] with belongings per protocol. Patient left Jeep key for sister to  during the day. Ruelas placed in charge nurse drawer.
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
## 2024-07-22 LAB
CHOLEST SERPL-MCNC: 144 MG/DL (ref 120–199)
CHOLEST/HDLC SERPL: 1.7 {RATIO} (ref 2–5)
ESTIMATED AVG GLUCOSE: 94 MG/DL (ref 68–131)
HBA1C MFR BLD: 4.9 % (ref 4–5.6)
HDLC SERPL-MCNC: 84 MG/DL (ref 40–75)
HDLC SERPL: 58.3 % (ref 20–50)
LDLC SERPL CALC-MCNC: 40.6 MG/DL (ref 63–159)
NONHDLC SERPL-MCNC: 60 MG/DL
TRIGL SERPL-MCNC: 97 MG/DL (ref 30–150)

## 2024-07-22 PROCEDURE — 25000003 PHARM REV CODE 250: Performed by: STUDENT IN AN ORGANIZED HEALTH CARE EDUCATION/TRAINING PROGRAM

## 2024-07-22 PROCEDURE — 99232 SBSQ HOSP IP/OBS MODERATE 35: CPT | Mod: AF,HB,, | Performed by: STUDENT IN AN ORGANIZED HEALTH CARE EDUCATION/TRAINING PROGRAM

## 2024-07-22 PROCEDURE — 83036 HEMOGLOBIN GLYCOSYLATED A1C: CPT | Performed by: STUDENT IN AN ORGANIZED HEALTH CARE EDUCATION/TRAINING PROGRAM

## 2024-07-22 PROCEDURE — 90833 PSYTX W PT W E/M 30 MIN: CPT | Mod: AF,HB,, | Performed by: STUDENT IN AN ORGANIZED HEALTH CARE EDUCATION/TRAINING PROGRAM

## 2024-07-22 PROCEDURE — 36415 COLL VENOUS BLD VENIPUNCTURE: CPT | Performed by: STUDENT IN AN ORGANIZED HEALTH CARE EDUCATION/TRAINING PROGRAM

## 2024-07-22 PROCEDURE — 80061 LIPID PANEL: CPT | Performed by: STUDENT IN AN ORGANIZED HEALTH CARE EDUCATION/TRAINING PROGRAM

## 2024-07-22 PROCEDURE — 25000242 PHARM REV CODE 250 ALT 637 W/ HCPCS: Performed by: STUDENT IN AN ORGANIZED HEALTH CARE EDUCATION/TRAINING PROGRAM

## 2024-07-22 PROCEDURE — 11400000 HC PSYCH PRIVATE ROOM

## 2024-07-22 RX ADMIN — GUAIFENESIN 600 MG: 600 TABLET, EXTENDED RELEASE ORAL at 08:07

## 2024-07-22 RX ADMIN — LISINOPRIL 10 MG: 10 TABLET ORAL at 08:07

## 2024-07-22 RX ADMIN — ACETAMINOPHEN 650 MG: 325 TABLET ORAL at 01:07

## 2024-07-22 RX ADMIN — SERTRALINE 100 MG: 100 TABLET, FILM COATED ORAL at 08:07

## 2024-07-22 RX ADMIN — ACETAMINOPHEN 650 MG: 325 TABLET ORAL at 07:07

## 2024-07-22 RX ADMIN — FLUTICASONE PROPIONATE 50 MCG: 50 SPRAY, METERED NASAL at 08:07

## 2024-07-22 RX ADMIN — LAMOTRIGINE 100 MG: 100 TABLET ORAL at 08:07

## 2024-07-22 RX ADMIN — HYDROXYZINE PAMOATE 50 MG: 50 CAPSULE ORAL at 11:07

## 2024-07-22 RX ADMIN — AMLODIPINE BESYLATE 5 MG: 5 TABLET ORAL at 08:07

## 2024-07-22 NOTE — PSYCH
"Meditation/Education:  We All Connected: Meditation Exercise    Patient Response:  "Patient attended session and gave positive feedback and found the session helpful".              "

## 2024-07-22 NOTE — PLAN OF CARE
Pt is calm and cooperative.  Denies any S/I or H/I at this time.  Mood continues to improve.  Compliant with treatment.  Behavior appropriate.  Interacting well on unit.  No acute distress apparent at this time, will continue to monitor.

## 2024-07-22 NOTE — PSYCH
"Meditation/Education:  Release Tension: Meditation course  Patient Response:  "Patient attended session and gave positive feedback and found the session helpful".              "

## 2024-07-22 NOTE — PROGRESS NOTES
"  PSYCHIATRY DAILY INPATIENT PROGRESS NOTE  SUBSEQUENT HOSPITAL VISIT    ENCOUNTER DATE: 7/22/2024  [facility]    DATE OF ADMISSION: 7/21/2024  3:05 AM  LENGTH OF STAY: 1 days      CHIEF COMPLAINT   Yumiko Bautista is a 33 y.o. female, seen during daily [place] rounds on the inpatient unit.  Yumiko Bautista presented with the chief complaint of depression and anxiety      The patient was seen and examined. The chart was reviewed.     Reviewed notes from Rns and labs from the last 24 hours.    The patient's case was discussed with the treatment team/care providers today including Rns and MD    Staff reports no behavioral or management issues.     The patient has been compliant with treatment.      Subjective 07/22/2024       Today the patient reports "I feel much better," reports "It may have been the Lord sending me here."    Discussed education, goals and coping skills at length. Discussed plan for discharge tomorrow.      The patient denies any side effects to medications.    At this time symptoms remains severe, functionally and behaviorally impairing and pt remains in need of continued acute inpatient hospitalization for both safety and stabilization.           Interim/overnight events per report/notes:          Psychiatric ROS (observed, reported, or endorsed/denied):  Depressed mood - less  Interest/pleasure/anhedonia: No  Guilt/hopelessness/worthlessness - No  Changes in Sleep - less  Changes in Appetite - less  Changes in Concentration - No  Changes in Energy - less  PMA/R- less  Suicidal- active/passive ideations - denies  Homicidal ideations: active/passive ideations - denies    Hallucinations - No  Delusions - No  Disorganized behavior - No  Disorganized speech - No  Negative symptoms - No    Elevated mood - No  Decreased need for sleep - No  Grandiosity - No  Racing thoughts - No  Impulsivity - No  Irritability- No  Increased energy - No  Distractibility - No  Increase in goal-directed " activity or PMA- No    Symptoms of RERE - less  Symptoms of Panic Disorder- No  Symptoms of PTSD - less        Overall progress: Patient is showing significant improvement        Psychotherapy:  Target symptoms: depression, anxiety   Why chosen therapy is appropriate versus another modality: relevant to diagnosis  Outcome monitoring methods: self-report  Therapeutic intervention type: supportive psychotherapy  Topics discussed/themes: relationships difficulties, building skills sets for symptom management, symptom recognition  The patient's response to the intervention is accepting. The patient's progress toward treatment goals is fair.   Duration of intervention: 16 minutes.        Medical ROS  Review of Systems   Constitutional:  Negative for chills and fever.   HENT:  Negative for hearing loss.    Eyes:  Negative for blurred vision and double vision.   Respiratory:  Negative for shortness of breath.    Cardiovascular:  Negative for chest pain and palpitations.   Gastrointestinal:  Negative for constipation, diarrhea, nausea and vomiting.   Genitourinary:  Negative for dysuria.   Musculoskeletal:  Negative for back pain and neck pain.   Skin:  Negative for rash.   Neurological:  Negative for dizziness and headaches.   Endo/Heme/Allergies:  Negative for environmental allergies.         PAST MEDICAL HISTORY   Past Medical History:   Diagnosis Date    Anemia     Hypertension     Mental disorder     Bi polar    Vaginismus     treated w/ physical therapy           PSYCHOTROPIC MEDICATIONS   Scheduled Meds:   amLODIPine  5 mg Oral Daily    fluticasone propionate  1 spray Each Nostril Daily    guaiFENesin  600 mg Oral BID    lamoTRIgine  100 mg Oral QHS    lisinopriL  10 mg Oral Daily    nicotine  1 patch Transdermal Daily    sertraline  100 mg Oral Daily     Continuous Infusions:  PRN Meds:.  Current Facility-Administered Medications:     acetaminophen, 650 mg, Oral, Q6H PRN    hydrOXYzine pamoate, 50 mg, Oral, Q6H PRN     OLANZapine, 10 mg, Oral, Q8H PRN **AND** OLANZapine, 10 mg, Intramuscular, Q8H PRN        EXAMINATION    VITALS   Vitals:    07/21/24 0718 07/21/24 0800 07/21/24 1931 07/22/24 0724   BP: (!) 144/88  132/85 120/79   BP Location: Left arm  Left arm Left arm   Patient Position: Sitting  Sitting Sitting   Pulse: 73  77 74   Resp: 16  18 18   Temp: 97.4 °F (36.3 °C)  97.6 °F (36.4 °C) 97.5 °F (36.4 °C)   TempSrc: Tympanic  Temporal Tympanic   SpO2: 99%  100% 100%   Weight:  78 kg (172 lb 1.1 oz)     Height:           Body mass index is 28.2 kg/m².        CONSTITUTIONAL  General Appearance: unremarkable, age appropriate    MUSCULOSKELETAL  Muscle Strength and Tone:no tremor, no tic  Abnormal Involuntary Movements: No  Gait and Station: non-ataxic    PSYCHIATRIC   Level of Consciousness: awake and alert   Orientation: person, place, and situation  Grooming: Casually dressed and Well groomed  Psychomotor Behavior: normal, cooperative  Speech: normal tone, normal rate, normal pitch, normal volume  Language: grossly intact  Mood: fine  Affect: Consistent with mood  Thought Process: linear, logical  Associations: intact   Thought Content: denies SI, denies HI, and no delusions  Perceptions: denies AH and denies  VH  Memory: Able to recall past events, Remote intact, and Recent intact  Attention:Attends to interview without distraction  Fund of Knowledge: Aware of current events and Vocabulary appropriate   Estimate if Intelligence:  Average based on work/education history, vocabulary and mental status exam  Insight: has awareness of illness  Judgment: behavior is adequate to circumstances        DIAGNOSTIC TESTING   Laboratory Results  Recent Results (from the past 24 hour(s))   Hemoglobin A1c    Collection Time: 07/22/24  6:09 AM   Result Value Ref Range    Hemoglobin A1C 4.9 4.0 - 5.6 %    Estimated Avg Glucose 94 68 - 131 mg/dL   Lipid panel    Collection Time: 07/22/24  6:09 AM   Result Value Ref Range    Cholesterol 144  120 - 199 mg/dL    Triglycerides 97 30 - 150 mg/dL    HDL 84 (H) 40 - 75 mg/dL    LDL Cholesterol 40.6 (L) 63.0 - 159.0 mg/dL    HDL/Cholesterol Ratio 58.3 (H) 20.0 - 50.0 %    Total Cholesterol/HDL Ratio 1.7 (L) 2.0 - 5.0    Non-HDL Cholesterol 60 mg/dL         MEDICAL DECISION MAKING          ASSESSMENT         MDD, recurrent, severe  SI  RERE  PTSD  Psychosocial stressors  HTN              PROBLEM LIST AND MANAGEMENT PLANS         MDD, recurrent, severe  - decreased lamictal 200 PO qd to 100 mg PO qhs to due to lack of effectiveness and c/o lethargy  - increased zoloft 50 to 100 mg PO qd  - pt counseled  - follow up with outpatient mental health providers after discharge for medication management and psychotherapy    Suicidal ideations  - continue psychiatric hospitalization  - provide psychotherapeutic interventions and medication management     RERE  - increased zoloft 50 to 100 mg PO qd  - pt counseled  - follow up with outpatient mental health providers after discharge for medication management and psychotherapy    PTSD  - increased zoloft 50 to 100 mg PO qd  - pt counseled  - follow up with outpatient mental health providers after discharge for medication management and psychotherapy        Psychosocial stressors  - pt counseled  - SW consulted for assistance with additional resources            HTN  - resumed home medications lisinopril 10 mg PO qd  - Fm consulted  - monitor             Discussed diagnosis, risks and benefits of proposed treatment vs alternative treatments vs no treatment, potential side effects of these treatments and the inherent unpredictability of treatment. The patient expresses understanding of the above and displays the capacity to agree with this treatment given said understanding. Patient also agrees that, currently, the benefits outweigh the risks and would like to pursue/continue treatment at this time.    Any medications being used off-label were discussed with the patient  inclusive of the evidence base for the use of the medications and consent was obtained for the off-label use of the medication.       DISCHARGE PLANNING  Expected Disposition Plan: Home or Self Care      NEED FOR CONTINUED HOSPITALIZATION  Psychiatric illness continues to pose a potential threat to life or bodily function, of self or others, thereby requiring the need for continued inpatient psychiatric hospitalization: Yes, due to: danger to self, as evidenced by:  Concerns with SI--Fading.    Protective inpatient pyschiatric hospitalization required while a safe disposition plan is enacted: Yes    Patient stabilized and ready for discharge from inpatient psychiatric unit: No        STAFF:   Killian Hernandes III, MD  Psychiatry

## 2024-07-23 VITALS
WEIGHT: 172.06 LBS | HEIGHT: 66 IN | DIASTOLIC BLOOD PRESSURE: 72 MMHG | SYSTOLIC BLOOD PRESSURE: 128 MMHG | TEMPERATURE: 98 F | BODY MASS INDEX: 27.65 KG/M2 | RESPIRATION RATE: 16 BRPM | HEART RATE: 87 BPM | OXYGEN SATURATION: 100 %

## 2024-07-23 PROCEDURE — 25000242 PHARM REV CODE 250 ALT 637 W/ HCPCS: Performed by: STUDENT IN AN ORGANIZED HEALTH CARE EDUCATION/TRAINING PROGRAM

## 2024-07-23 PROCEDURE — 25000003 PHARM REV CODE 250: Performed by: STUDENT IN AN ORGANIZED HEALTH CARE EDUCATION/TRAINING PROGRAM

## 2024-07-23 PROCEDURE — 99239 HOSP IP/OBS DSCHRG MGMT >30: CPT | Mod: AF,HB,, | Performed by: STUDENT IN AN ORGANIZED HEALTH CARE EDUCATION/TRAINING PROGRAM

## 2024-07-23 RX ORDER — LAMOTRIGINE 100 MG/1
100 TABLET ORAL DAILY
Qty: 30 TABLET | Refills: 0 | Status: SHIPPED | OUTPATIENT
Start: 2024-07-23

## 2024-07-23 RX ORDER — SERTRALINE HYDROCHLORIDE 100 MG/1
100 TABLET, FILM COATED ORAL DAILY
Qty: 30 TABLET | Refills: 0 | Status: SHIPPED | OUTPATIENT
Start: 2024-07-24 | End: 2025-07-24

## 2024-07-23 RX ADMIN — ACETAMINOPHEN 650 MG: 325 TABLET ORAL at 07:07

## 2024-07-23 RX ADMIN — LISINOPRIL 10 MG: 10 TABLET ORAL at 07:07

## 2024-07-23 RX ADMIN — FLUTICASONE PROPIONATE 50 MCG: 50 SPRAY, METERED NASAL at 08:07

## 2024-07-23 RX ADMIN — SERTRALINE 100 MG: 100 TABLET, FILM COATED ORAL at 08:07

## 2024-07-23 RX ADMIN — AMLODIPINE BESYLATE 5 MG: 5 TABLET ORAL at 08:07

## 2024-07-23 RX ADMIN — ACETAMINOPHEN 650 MG: 325 TABLET ORAL at 01:07

## 2024-07-23 NOTE — PROGRESS NOTES
"   07/22/24 5675   Assessment   Patient's Identification of the Problem Patient presents calm, cooperative, reports a better mood. Patient states her admit is due to "I felt like I was having a nervous breakdown. I couldn't stop crying, so I drove myself to the hospital." Patient reports she told the ER doctor "I feel like I could just jump off a bridge." Patient verbalized that  something happened Friday that triggered her childhood trauma. Patient denies alcohol or drug use. Patient reports she is single, no kids, finished high school & trade school, is a , employed(community outreach),is suppose to wear glasses, lives with her mother. Patient verbalized her main goal is to work through getting past the trauma.   Leisure Interest Listen to Music;Watching TV;Reading;Enjoy Family/Friends;Protestant;Journal   Leisure Barriers Other (See Comments)  (being tired)   Treatment Focus To Improve Mood;To Increase Motivation;To Improve Leisure Awareness/Lifestyle/Interest;To Promote Successful and Safe Self Expression;To Improve Coping Skills       Treatment Recommendation:   1:1 Intervention (as needed)    Cognitive Stimulation Skilled Activity  Self Expression Skilled Activity  Mild Exercises Skilled Activity  Stress Management Skilled Activity  Coping Skilled Activity  Leisure Education and Awareness Skilled Activity    Treatment Goal(s):  Long Term Goals Refer To Master Treatment Plan    Short Term Treatment Goal(s)  Patient Will:  Comply with Treatment  Exhibit Improvement in Mood  Demonstrate Constructive Expression of Feelings and Behavior  Verbalize Improvement in Mood  Identify at Least 2 Coping Skills or Leisure Skills to Reduce Depression and Hopelessness Upon Request from Therapist    Discharge Recommendations:  Encourage Patient to Actively Utilize Available Community Resources to Increase Leisure Involvement to Decrease Signs and Symptoms of Illness  Encourage Patient to Utilize Coping Skills on a " Regular Basis to Reduce the Risk of Decompensating and Re-Hospitalizations  Support Group  Follow Up with After Care Appointments  Continue with Current Leisure Activities

## 2024-07-23 NOTE — PSYCH
The patient has transitioned to outpatient treatment at [facility], [facility] [address], [place] [address] [phone] FAx: [phone]. An appointment has been scheduled on Aug.2,2024@2:00p.m.. Please bring insurance card and picture ID upon arrival. The patient will receive medication management, and counseling . The AVS was faxed on 07/23/2024 at 10:25 am.

## 2024-07-23 NOTE — PLAN OF CARE
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 endorse in drug use.    Additional Comments: Pt UDS was negative upon admit.     Education:     Currently Enrolled? No  Attended College/Technical School and has high school diploma    Special Education? No    Interested in Completing Education/GED: No    Employment and Financial:     Currently employed? 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     Source of Income: salary    Able to afford basic needs (food, shelter, utilities)? Yes    Who manages finances/personal affairs? self      Service:     [place]? no    Combat Service? No     Community Resources:     Describe present use of community resources: [facility]     Identify previously used community resources   (Include previous mental health treatment - outpatient and inpatient): [facility]    Environmental:     Current living situation:Lives with family    Social Evaluation:     Patient Assets: Average or above intelligence, Work skills, Active sense of humor, Ability for insight, Communicable skills, and Financial means    Patient Limitations: none    High risk psychosocial issues that may impact discharge planning:    Suicidal  ideations    Recommendations:     Anticipated discharge plan:   outpatient follow up  [facility]    High risk issues requiring early treatment planning and immediate intervention:    Suicidal Ideations    Community resources needed for discharge planning:  aftercare treatment sources    Anticipated social work role(s) in treatment and discharge planning:   PLPC will engage pt in treatment and encourage participation in group therapy. Safety plan to be facilitated and encouraged. PLPC will aid in discharge planning.

## 2024-07-23 NOTE — NURSING
Family member is here for transportation to home. Patient received all personal items, prescriptions, and discharge papers.  Patient voiced understanding of all AVS discharge information. Patient denies distress, denies SI/HI. NAD noted. Patient escorted by [place] downstairs for family member to transport to home.   NAD noted.

## 2024-07-23 NOTE — PSYCH
"Meditation/Education:  Relaxing The Muscle: Meditation Exercise    Patient Response:  "Patient attended session and gave positive feedback and found the session helpful".              "

## 2024-07-23 NOTE — PLAN OF CARE
Pt is sleeping at this time and has slept 2.5 hours.  Pt had trouble falling asleep even after prn vistaril.  Says she has vistaril at home and it does not work. Resp even & unlabored. Pathways clear. Q 15 minute safety checks ongoing. All precautions maintained.
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
## 2024-07-23 NOTE — PROGRESS NOTES
"   07/23/24 1130   [facility] Group Therapy   Group Name Other  (1:1)   Specific Interventions Coping Skills Training   Participation Level Appropriate;Attentive;Sharing   Participation Quality Cooperative;Social   Insight/Motivation Good   Affect/Mood Display Appropriate   Cognition Alert   Psychomotor WNL     Patient presents calm, cooperative, reports a "tired, but good" mood, "it helped a lot being here, people listened and understood. Patient accepted positive coping skills for stress & anxiety and identified interest of taking photos, meal prepping, sitting outside, walking and going to the gym.  "

## 2024-07-23 NOTE — PLAN OF CARE
Pt has been out in dayroom all shift.  Interacting well with staff and peers.  Says she is looking forward to planned dc tomorrow.  Looks forward to taking shower in own house and treating self to something good to eat.  Denies SI/HI/hallucinations.  Rates depression and anxiety 0/10.  Only complaint was no BM since 7/18, but says she has chronic constipation.  Received prn tylenol for complaint of menstrual cramps.  Eating ok and sleeping fair.  Says was scared to sleep the first night here, but last night felt more comfortable after talking with people and was able to sleep.  Took meds as ordered and ate snacks.  No behavior problem. No distress noted.

## 2024-07-23 NOTE — NURSING
"Discharge instructions on medication, self care, suicide crisis hotline, and scheduled follow up appointment with the local mental health clinic. Patient voiced understanding of all discharge teachings. Patient denies SI/HI. Patient denies distress and reports " I am feeling so much better".  Patient is awaiting her mother for transportation to home.    "
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
## 2024-08-22 ENCOUNTER — NURSE TRIAGE (OUTPATIENT)
Dept: ADMINISTRATIVE | Facility: CLINIC | Age: 34
End: 2024-08-22
Payer: MEDICAID

## 2024-08-22 NOTE — TELEPHONE ENCOUNTER
Pt states recently in hospital. Pt asking if ok to take Gabapentin and zoloft together. Per protocol, call pharmacist within 24 hours. Also suggested PCP. Pt states PCP not with [facility]. Reiterated disposition.     Reason for Disposition   [1] Caller has medicine question about med NOT prescribed by PCP AND [2] triager unable to answer question (e.g., compatibility with other med, storage)    Protocols used: Medication Question Call-A-